# Patient Record
Sex: MALE | Race: WHITE | NOT HISPANIC OR LATINO | Employment: OTHER | URBAN - METROPOLITAN AREA
[De-identification: names, ages, dates, MRNs, and addresses within clinical notes are randomized per-mention and may not be internally consistent; named-entity substitution may affect disease eponyms.]

---

## 2017-01-17 ENCOUNTER — ALLSCRIPTS OFFICE VISIT (OUTPATIENT)
Dept: OTHER | Facility: OTHER | Age: 52
End: 2017-01-17

## 2017-01-17 DIAGNOSIS — F31.13 BIPOLAR DISORDER, CURRENT EPISODE MANIC WITHOUT PSYCHOTIC FEATURES, SEVERE (HCC): ICD-10-CM

## 2017-04-18 ENCOUNTER — ALLSCRIPTS OFFICE VISIT (OUTPATIENT)
Dept: OTHER | Facility: OTHER | Age: 52
End: 2017-04-18

## 2017-06-13 ENCOUNTER — GENERIC CONVERSION - ENCOUNTER (OUTPATIENT)
Dept: OTHER | Facility: OTHER | Age: 52
End: 2017-06-13

## 2017-06-13 LAB
BUN SERPL-MCNC: 11 MG/DL (ref 7–25)
BUN/CREA RATIO (HISTORICAL): NORMAL (CALC) (ref 6–22)
CALCIUM SERPL-MCNC: 9.3 MG/DL (ref 8.6–10.3)
CHLORIDE SERPL-SCNC: 108 MMOL/L (ref 98–110)
CO2 SERPL-SCNC: 26 MMOL/L (ref 20–31)
CREAT SERPL-MCNC: 0.97 MG/DL (ref 0.6–1.35)
EGFR AFRICAN AMERICAN (HISTORICAL): 106 ML/MIN/1.73M2
EGFR-AMERICAN CALC (HISTORICAL): 91 ML/MIN/1.73M2
GLUCOSE (HISTORICAL): 95 MG/DL (ref 65–99)
LITHIUM LEVEL (HISTORICAL): 0.6 MMOL/L (ref 0.6–1.2)
POTASSIUM SERPL-SCNC: 4.1 MMOL/L (ref 3.5–5.3)
SODIUM SERPL-SCNC: 142 MMOL/L (ref 135–146)

## 2017-07-10 ENCOUNTER — ALLSCRIPTS OFFICE VISIT (OUTPATIENT)
Dept: OTHER | Facility: OTHER | Age: 52
End: 2017-07-10

## 2017-10-09 ENCOUNTER — ALLSCRIPTS OFFICE VISIT (OUTPATIENT)
Dept: OTHER | Facility: OTHER | Age: 52
End: 2017-10-09

## 2017-12-27 ENCOUNTER — ALLSCRIPTS OFFICE VISIT (OUTPATIENT)
Dept: OTHER | Facility: OTHER | Age: 52
End: 2017-12-27

## 2018-01-09 NOTE — PSYCH
Psych Med Mgmt    Appearance: was calm and cooperative, adequate hygiene and grooming and good eye contact  Observed mood: euthymic  Observed mood: affect was blunted and affect was constricted  Speech: speech soft  Thought processes: coherent/organized  Hallucinations: no hallucinations present  Abnormal Thoughts: The patient has no suicidal thoughts and no homicidal thoughts  Orientation: The patient is oriented to person, place and time  Recent and Remote Memory: short term memory intact and long term memory intact  Attention Span And Concentration: concentration intact  Insight: Insight intact  Judgment: His judgment was intact  Muscle Strength And Tone  Muscle strength and tone were normal  Normal gait and station  On a scale of 0 - 10 the pain severity is a 0  Treatment Recommendations: continue with same meds and return to office in a few weeks  Risks, Benefits And Possible Side Effects Of Medications: Risks, benefits, and possible side effects of medications explained to patient and patient verbalizes understanding  He reports normal appetite, normal energy level, no weight change and normal number of sleep hours  The patient was seen for continuing care and pharmacotherapy  There has been no change in his clinical status and he remains in remission  He complains of increased urinary frequency and polyuria which is related to lithium  His lithium level from a few weeks back is 0 8  They were able to buy his mother-in-law's home through foreclosure  Sleeping and eating well  Assessment    1  Bipolar I disorder, current or most recent episode manic, severe with atypical features   (296 43) (F31 13)    Review of Systems    Constitutional: No fever, no chills, feels well, no tiredness, no recent weight gain or loss  Cardiovascular: no complaints of slow or fast heart rate, no chest pain, no palpitations     Respiratory: no complaints of shortness of breath, no wheezing, no dyspnea on exertion  Genitourinary: urinary frequency  Musculoskeletal: no complaints of arthralgia, no myalgias, no limb pain, no joint stiffness  Integumentary: no complaints of skin rash, no itching, no dry skin  Active Problems    1  Bipolar I disorder, current or most recent episode manic, severe with atypical features   (296 43) (F31 13)   2  Encounter for long-term (current) use of high-risk medication (V58 69) (Z79 899)   3  Encounter for medication monitoring (V58 83) (Z51 81)    Allergies    1  No Known Drug Allergies    Current Meds   1  Benztropine Mesylate 2 MG Oral Tablet; TAKE 1 TABLET DAILY AS DIRECTED; Therapy: 65WKM6306 to (Last Rx:28Mar2016) Ordered   2  Lithium Carbonate  MG Oral Tablet Extended Release; Take 1 tablet twice daily; Therapy: 46WVR5667 to (Renew:23Mar2017); Last Rx:28Mar2016 Ordered   3  OLANZapine 5 MG Oral Tablet; 1 po qhs;   Therapy: 25QRJ9550 to (Renew:23Mar2017); Last Rx:28Mar2016 Ordered    Family Psych History  Mother    1  No pertinent family history    Social History    · Never a smoker    End of Encounter Meds    1  Benztropine Mesylate 2 MG Oral Tablet; TAKE 1 TABLET DAILY AS DIRECTED; Therapy: 71YVX9527 to (Last Rx:28Mar2016) Ordered   2  Lithium Carbonate  MG Oral Tablet Extended Release; Take 1 tablet twice daily; Therapy: 85VPK7021 to (Renew:23Mar2017); Last Rx:28Mar2016 Ordered   3  OLANZapine 5 MG Oral Tablet; 1 po qhs;   Therapy: 17APT9695 to (Renew:23Mar2017);  Last Rx:28Mar2016 Ordered    Future Appointments    Date/Time Provider Specialty Site   08/29/2016 03:10 PM Farhan Hidalgo MD Psychiatry [facility]   09/27/2016 03:10 PM Farhan Hidalgo MD Psychiatry [facility]   10/31/2016 03:10 PM Farhan Hidalgo MD Psychiatry [facility]   11/22/2016 03:10 PM Farhan Hidalgo MD Psychiatry [facility]   12/19/2016 03:30 PM Mynor Jaden Patel MD Psychiatry [facility]   [facility]2016 09:[facility]  [address] [facility]     Signatures   Electronically signed by : Malena Magdaleno MD; Jun 27 2016  3:26PM EST                       (Author)

## 2018-01-11 NOTE — PSYCH
Psych Med Mgmt    Appearance: was calm and cooperative and good eye contact   unshaven beard  Observed mood: euthymic  Observed mood: affect appropriate  Speech: speech soft  Thought processes: coherent/organized  Hallucinations: no hallucinations present  Thought Content: no delusions  Abnormal Thoughts: The patient has no suicidal thoughts and no homicidal thoughts  Orientation: The patient is oriented to person, place and time  Recent and Remote Memory: short term memory intact and long term memory intact  Attention Span And Concentration: concentration impaired  Insight: Insight intact  Judgment: His judgment was intact  Treatment Recommendations: continue with same meds  Check Li level and BMP  RTO in 3 months  Risks, Benefits And Possible Side Effects Of Medications: Risks, benefits, and possible side effects of medications explained to patient and patient verbalizes understanding  He reports decreased appetite, normal energy level and recent slight lbs weight loss  The patient was seen for continuing care and pharmacotherapy of bipolar disorder  There has been no change in his clinical status  Some people who were staying at the house and paying rent and share expenses, have moved out and this is causing financial problems  He has lost a few pounds because his appetite is not as good  No diarrhea or vomiting reported  Sleeps well  Assessment    1  Bipolar I disorder, current or most recent episode manic, severe with atypical features   (296 43) (F31 13)    Plan    1  (1) BASIC METABOLIC PROFILE; Status:Active; Requested LFH:27PGP5996;    2  (1) LITHIUM; Status:Active; Requested TSM:91IBD5724;     Review of Systems    Constitutional: No fever, no chills, feels well, no tiredness, no recent weight gain or loss  Cardiovascular: no complaints of slow or fast heart rate, no chest pain, no palpitations     Respiratory: no complaints of shortness of breath, no wheezing, no dyspnea on exertion  Gastrointestinal: no complaints of abdominal pain, no constipation, no nausea, no diarrhea, no vomiting  Genitourinary: no complaints of dysuria, no incontinence, no pelvic pain, no urinary frequency  Musculoskeletal: no complaints of arthralgia, no myalgias, no limb pain, no joint stiffness  Active Problems    1  Bipolar I disorder, current or most recent episode manic, severe with atypical features   (296 43) (F31 13)   2  Encounter for long-term (current) use of high-risk medication (V58 69) (Z79 899)   3  Encounter for medication monitoring (V58 83) (Z51 81)    Allergies    1  No Known Drug Allergies    Current Meds   1  Benztropine Mesylate 1 MG Oral Tablet; TAKE 1 TABLET AT BEDTIME; Therapy: 14PWJ0663 to (Evaluate:29Apr2017); Last Rx:31Oct2016 Ordered   2  Lithium Carbonate  MG Oral Tablet Extended Release; Take 1 tablet twice daily; Therapy: 71FVR3168 to (Last Rx:31Oct2016) Ordered   3  OLANZapine 5 MG Oral Tablet; Therapy: 78RMD9093 to (Last Rx:31Oct2016) Ordered    Family Psych History  Mother    1  No pertinent family history    Social History    · Never a smoker    End of Encounter Meds    1  Benztropine Mesylate 1 MG Oral Tablet; TAKE 1 TABLET AT BEDTIME; Therapy: 87HCG6197 to (Evaluate:29Apr2017); Last Rx:31Oct2016 Ordered   2  Lithium Carbonate  MG Oral Tablet Extended Release; Take 1 tablet twice daily; Therapy: 59QNQ9254 to (Last Rx:31Oct2016) Ordered   3  OLANZapine 5 MG Oral Tablet;    Therapy: 91ZRN2707 to (Last Rx:31Oct2016) Ordered    Signatures   Electronically signed by : Deyanira Looney MD; Jan 17 2017  3:26PM EST                       (Author)

## 2018-01-12 NOTE — PSYCH
Message  Message Free Text Note Form: RX accidentally shredded so re ordered      Active Problems    1  Bipolar I disorder, current or most recent episode manic, severe with atypical features   (296 43) (F31 13)   2  Encounter for medication monitoring (M73 83) (Z51 81)    Current Meds   1  Benztropine Mesylate 2 MG Oral Tablet; take 1 tablet by mouth every day; Therapy: 79RAY0588 to (Evaluate:07Mar2016); Last Rx:46Jkh4756 Ordered   2  Lithium Carbonate  MG Oral Tablet Extended Release; Take 1 tablet twice daily; Therapy: 43CGX8397 to (Evaluate:07Mar2016); Last Rx:84Uvq6142 Ordered   3  OLANZapine 5 MG Oral Tablet; TAKE 1 TABLET AT BEDTIME; Therapy: 33HMI8410 to (Evaluate:07Mar2016); Last Rx:75Xwm5027 Ordered    Allergies    1  No Known Drug Allergies    Plan    1  Benztropine Mesylate 2 MG Oral Tablet; TAKE 1 TABLET DAILY AS DIRECTED   2  Lithium Carbonate  MG Oral Tablet Extended Release; Take 1 tablet   twice daily   3   OLANZapine 5 MG Oral Tablet; 1 po qhs    Signatures   Electronically signed by : MARU Mendoza; Mar  9 2016  9:57AM EST                       (Author)

## 2018-01-12 NOTE — PSYCH
Psych Med Mgmt    Appearance: was calm and cooperative  Observed mood: was dysphoric and apathetic   Observed mood: affect was blunted  Speech: a normal rate  Thought processes: coherent/organized  Hallucinations: no hallucinations present  Thought Content: no delusions  Abnormal Thoughts: The patient has no suicidal thoughts and no homicidal thoughts  Orientation: The patient is oriented to person, place and time  Recent and Remote Memory: short term memory intact and long term memory intact  Attention Span And Concentration: concentration intact  Insight: Insight intact  Judgment: His judgment was intact  Treatment Recommendations: Cogentin 2 mg po qhs  Lithium  mg po bid  Olanzapine 5 mg po qhs    Reviewed blood work with patient  Risks, Benefits And Possible Side Effects Of Medications: Risks, benefits, and possible side effects of medications explained to patient and patient verbalizes understanding  He reports normal appetite, decreased energy, no weight change and normal number of sleep hours  Pt seen for follow up Bipolar Disorder  He appears dysphoric with little motivation- residual depression  Sleep fair- disrupted at times due to polyuria from medication  Appetite is fair  He tries to spend time with family  He does have dry mouth but otherwise tolerating meds well  Vitals  Signs   Recorded: 70CPY5034 71:67WX   Systolic: 564  Diastolic: 85  Heart Rate: 61  Respiration: 16  Recorded: 16Mxn1382 09:36AM   Respiration: 16  Height: 5 ft 10 in  Weight: 187 lb   BMI Calculated: 26 83  BSA Calculated: 2 03    DSM    Provisional Diagnosis: Bipolar Disorder I  Assessment    1  Bipolar I disorder, current or most recent episode manic, severe with atypical features   (296 43) (F31 13)    Review of Systems    Constitutional: No fever, no chills, feels well, no tiredness, no recent weight gain or loss  Active Problems    1   Bipolar I disorder, current or most recent episode manic, severe with atypical features   (296 43) (F31 13)   2  Encounter for long-term (current) use of high-risk medication (V58 69) (Z79 899)   3  Encounter for medication monitoring (V58 83) (Z51 81)    Allergies    1  No Known Drug Allergies    Current Meds   1  Benztropine Mesylate 2 MG Oral Tablet; TAKE 1 TABLET DAILY AS DIRECTED; Therapy: 75NCP4928 to (Last Rx:28Mar2016) Ordered   2  Lithium Carbonate  MG Oral Tablet Extended Release; Take 1 tablet twice daily; Therapy: 53NXQ5517 to (Renew:23Mar2017); Last Rx:28Mar2016 Ordered   3  OLANZapine 5 MG Oral Tablet; 1 po qhs;   Therapy: 22RGR8007 to (Renew:23Mar2017); Last Rx:28Mar2016 Ordered    The medication list was reviewed and updated today  Family Psych History  Mother    1  No pertinent family history    The family history was reviewed and updated today  Social History    · Never a smoker  The social history was reviewed and updated today  The social history was reviewed and is unchanged  End of Encounter Meds    1  Benztropine Mesylate 2 MG Oral Tablet; TAKE 1 TABLET DAILY AS DIRECTED; Therapy: 56RDD5482 to (Last Rx:28Mar2016) Ordered   2  Lithium Carbonate  MG Oral Tablet Extended Release; Take 1 tablet twice daily; Therapy: 95EPF9286 to (Renew:23Mar2017); Last Rx:28Mar2016 Ordered   3  OLANZapine 5 MG Oral Tablet; 1 po qhs;   Therapy: 82VSP7435 to (Renew:23Mar2017);  Last Rx:28Mar2016 Ordered    Future Appointments    Date/Time Provider Specialty Site   08/29/2016 03:10 PM Timothy Dietz MD Psychiatry [facility]   09/27/2016 03:10 PM Timothy Dietz MD Psychiatry [facility]   10/31/2016 03:10 PM Timothy Dietz MD Psychiatry [facility]   11/22/2016 03:10 PM Timothy Dietz MD Psychiatry [facility]   12/19/2016 03:30 PM Timothy Dietz MD Psychiatry [place] [address]  [address] Electronically signed by : Sugey Etienne [facility]; Jul 20 2016  9:45AM EST                       (Author)    Electronically signed by : Angelia Rangel MD; Aug  9 2016  3:18PM EST

## 2018-01-12 NOTE — PSYCH
Psych Med Mgmt    Appearance: was calm and cooperative  Observed mood: was dysphoric  Observed mood: affect was constricted  Speech: monotonous  Thought processes: coherent/organized  Hallucinations: no hallucinations present  Thought Content: no delusions  Abnormal Thoughts: The patient has no suicidal thoughts and no homicidal thoughts  Orientation: The patient is oriented to person, place and time  Recent and Remote Memory: short term memory intact and long term memory intact  Attention Span And Concentration: concentration intact  Insight: Insight intact  Judgment: His judgment was intact  Muscle Strength And Tone  Normal gait and station  Treatment Recommendations: Check  Lithium level, lipid panel and CMP    Lithium  mg 2 po qhs  Olanzapine 5 mg po qhs  Cogentin 2 mg po qd  Risks, Benefits And Possible Side Effects Of Medications: Risks, benefits, and possible side effects of medications explained to patient and patient verbalizes understanding  He reports decreased appetite, decreased energy, no weight change and decrease in number of sleep hours   Pt seen for follow up Bipolar Disorder I  Pt states he feels "the same"  He continues with a flat affect and little motivation  Speech lacks spontaneity  No anxiety or panic attacks- states his depression is "same"  He states he goes to [employer] during the days  He has a stable relationship with spouse  No new medical issues or problems  Discussed diet- states he only eats once a day most days  Vitals  Signs [Data Includes: Current Encounter]   Recorded: [id number] 03:27PM   Heart Rate: 65  Respiration: 16  Systolic: 508  Diastolic: 81  Recorded: 17AIO7291 03:17PM   Height: 5 ft 10 in  Weight: 185 lb   BMI Calculated: 26 54  BSA Calculated: 2 02    DSM    Provisional Diagnosis: Bipolar Disorder I  Assessment    1   Bipolar I disorder, current or most recent episode manic, severe with atypical features (296 43) (F31 13)   2  Encounter for medication monitoring (V58 83) (Z38 81)    Plan    1  (1) COMPREHENSIVE METABOLIC PANEL; Status:Active; Requested for:11Let3429;    2  (1) LIPID PANEL, FASTING; Status:Active; Requested for:38Uim2806;    3  (1) LITHIUM; Status:Active; Requested for:61Nhu6975;     Review of Systems    Constitutional: No fever, no chills, feels well, no tiredness, no recent weight gain or loss  Cardiovascular: no complaints of slow or fast heart rate, no chest pain, no palpitations  Respiratory: no complaints of shortness of breath, no wheezing, no dyspnea on exertion  Gastrointestinal: no complaints of abdominal pain, no constipation, no nausea, no diarrhea, no vomiting  Genitourinary: no complaints of dysuria, no incontinence, no pelvic pain, no urinary frequency  Musculoskeletal: no complaints of arthralgia, no myalgias, no limb pain, no joint stiffness  Integumentary: no complaints of skin rash, no itching, no dry skin  Neurological: no complaints of headache, no confusion, no numbness, no dizziness  Active Problems    1  Bipolar I disorder, current or most recent episode manic, severe with atypical features   (296 43) (F31 13)   2  Encounter for medication monitoring (V58 83) (J42 28)    Allergies    1  No Known Drug Allergies    Current Meds   1  Benztropine Mesylate 2 MG Oral Tablet; TAKE 1 TABLET DAILY AS DIRECTED; Therapy: 74IIP4106 to (Last Rx:28Mar2016) Ordered   2  Lithium Carbonate  MG Oral Tablet Extended Release; Take 1 tablet twice daily; Therapy: 44IQR0898 to (Renew:23Mar2017); Last Rx:28Mar2016 Ordered   3  OLANZapine 5 MG Oral Tablet; 1 po qhs;   Therapy: 66CFR3393 to (Renew:23Mar2017); Last Rx:28Mar2016 Ordered    The medication list was reviewed and updated today  Family Psych History  Mother    1  No pertinent family history    The family history was reviewed and updated today         Social History    · Never a smoker  The social history was reviewed and updated today  The social history was reviewed and is unchanged  End of Encounter Meds    1  Benztropine Mesylate 2 MG Oral Tablet; TAKE 1 TABLET DAILY AS DIRECTED; Therapy: 26KFZ2858 to (Last Rx:28Mar2016) Ordered   2  Lithium Carbonate  MG Oral Tablet Extended Release; Take 1 tablet twice daily; Therapy: 70GJO4772 to (Renew:23Mar2017); Last Rx:28Mar2016 Ordered   3  OLANZapine 5 MG Oral Tablet; 1 po qhs;   Therapy: 66FNB9826 to (Renew:23Mar2017);  Last Rx:28Mar2016 Ordered    Future Appointments    Date/Time Provider Specialty Site   06/27/2016 03:10 PM Elijah De Jesus MD Psychiatry [facility]   08/29/2016 03:10 PM Elijah De Jesus MD Psychiatry [facility]   09/27/2016 03:10 PM Elijah De Jesus MD Psychiatry [facility]   10/31/2016 03:10 PM Elijah De Jesus MD Psychiatry [facility]   11/22/2016 03:10 PM Elijah De Jesus MD Psychiatry [facility]   12/19/2016 03:30 PM Elijah De Jesus MD Psychiatry [facility]   07/20/2016 09:30 AM Earl Guzmán [facility] Psychiatry [address] [facility]     Signatures   Electronically signed by : Emory Tobin [facility]; May 18 2016  3:31PM EST                       (Author)    Electronically signed by : Rodrick Duckworth MD; May 18 2016  5:17PM EST

## 2018-01-13 NOTE — RESULT NOTES
Verified Results  (1) LITHIUM 96SNW7686 08:59AM Falguni Lowe LEY[facility]   REPORT COMMENT:  FASTING:YES     Test Name Result Flag Reference   LITHIUM 0 6 mmol/L  0 6-1 2     (1) BASIC METABOLIC PROFILE 04EHH6356 08:59AM [employer]   REPORT COMMENT:  FASTING:YES     Test Name Result Flag Reference   GLUCOSE 95 mg/dL  65-99   Fasting reference interval   UREA NITROGEN (BUN) 11 mg/dL  7-25   CREATININE 0 97 mg/dL  0 60-1 35   eGFR NON-AFR   AMERICAN 91 mL/min/1 73m2  > OR = 60   eGFR AFRICAN AMERICAN 106 mL/min/1 73m2  > OR = 60   BUN/CREATININE RATIO   8-49   NOT APPLICABLE (calc)   SODIUM 142 mmol/L  135-146   POTASSIUM 4 1 mmol/L  3 5-5 3   CHLORIDE 108 mmol/L     CARBON DIOXIDE 26 mmol/L  20-31   CALCIUM 9 3 mg/dL  8 6-10 3

## 2018-01-14 NOTE — PSYCH
Psych Med Mgmt    Appearance: was calm and cooperative, adequate hygiene and grooming and good eye contact  Observed mood: anxious  Observed mood: affect was flat  Speech: decreased volume, but a normal rate  Thought processes: coherent/organized  Hallucinations: no hallucinations present  Thought Content: no delusions  Abnormal Thoughts: The patient has no suicidal thoughts and no homicidal thoughts  Orientation: The patient is oriented to person, place and time  Recent and Remote Memory: short term memory intact and long term memory intact  Attention Span And Concentration: concentration impaired  Insight: Insight intact  Judgment: His judgment was intact  Treatment Recommendations: continue with same meds and RTO in one month  Eskalith  mg b i d  Olanzapine 5 mg daily  Cogentin 2 mg daily  Risks, Benefits And Possible Side Effects Of Medications: Risks, benefits, and possible side effects of medications explained to patient and patient verbalizes understanding  He reports normal appetite, normal energy level, no weight change and decrease in number of sleep hours due to frequent urination  Robin See was seen for continuing care and pharmacotherapy  Since his last visit, his social security was approved  He is thinking about the future  Depressive symptoms are in remission and he has not had any manic symptoms  Unfortunately the side effects of medications remain a problem  He continues to have a flat affect and bradykinesia and also frequent urination which is related to lithium  Assessment    1  Bipolar I disorder, current or most recent episode manic, severe with atypical features   (296 43) (F31 13)    Active Problems    1  Bipolar I disorder, current or most recent episode manic, severe with atypical features   (296 43) (F31 13)   2  Encounter for medication monitoring (S93 91) (F68 99)    Allergies    1  No Known Drug Allergies    Current Meds   1  Benztropine Mesylate 2 MG Oral Tablet; TAKE 1 TABLET DAILY AS DIRECTED; Therapy: 29CTK3369 to (Last Rx:28Mar2016) Ordered   2  Lithium Carbonate  MG Oral Tablet Extended Release; Take 1 tablet twice daily; Therapy: 22PNK9608 to (Renew:23Mar2017); Last Rx:28Mar2016 Ordered   3  OLANZapine 5 MG Oral Tablet; 1 po qhs;   Therapy: 60PFX3874 to (Renew:23Mar2017); Last Rx:28Mar2016 Ordered    Family Psych History  Mother    1  No pertinent family history    Social History    · Never a smoker    End of Encounter Meds    1  Benztropine Mesylate 2 MG Oral Tablet; TAKE 1 TABLET DAILY AS DIRECTED; Therapy: 96SQJ2134 to (Last Rx:28Mar2016) Ordered   2  Lithium Carbonate  MG Oral Tablet Extended Release; Take 1 tablet twice daily; Therapy: 51SEC5462 to (Renew:23Mar2017); Last Rx:28Mar2016 Ordered   3  OLANZapine 5 MG Oral Tablet; 1 po qhs;   Therapy: 48QQV6029 to (Renew:23Mar2017);  Last Rx:28Mar2016 Ordered    Future Appointments    Date/Time Provider Specialty Site   05/24/2016 03:10 PM Elijah De Jesus MD Psychiatry [facility]   06/27/2016 03:10 PM Elijah De Jesus MD Psychiatry [facility]   08/29/2016 03:10 PM Elijah De Jesus MD Psychiatry [facility]   09/27/2016 03:10 PM Elijah De Jesus MD Psychiatry [facility]   10/31/2016 03:10 PM Elijah De Jesus MD Psychiatry [facility]   11/22/2016 03:10 PM Elijah De Jesus MD Psychiatry [facility]   12/19/2016 03:30 PM Elijah De Jesus MD Psychiatry [facility]   07/20/2016 09:30 AM Earl Guzmán [facility] Psychiatry [facility]     Signatures   Electronically signed by : Rodrick Duckworth MD; Apr 26 2016  3:13PM EST                       (Author)

## 2018-01-14 NOTE — PSYCH
Psych Med Mgmt    Appearance: was calm and cooperative, adequate hygiene and grooming and good eye contact  Observed mood: was dysphoric and anxious  Observed mood: affect was flat  Speech:  muffled  Thought processes: coherent/organized  Hallucinations: no hallucinations present  Thought Content: no delusions  Abnormal Thoughts: The patient has no suicidal thoughts  Orientation: The patient is oriented to person, place and time  Recent and Remote Memory: short term memory intact and long term memory intact  Attention Span And Concentration: concentration impaired  Insight: Insight intact  Muscle Strength And Tone  Muscle strength and tone were normal    The patient is experiencing no localized pain  Treatment Recommendations: Continue with same medications and return to the office in a few weeks  Risks, Benefits And Possible Side Effects Of Medications: Risks, benefits, and possible side effects of medications explained to patient and patient verbalizes understanding  He reports normal appetite, normal energy level, no weight change and normal number of sleep hours  Hugh Michael was seen for continuing care and pharmacotherapy  Although he is not psychotic, he still has anxiety  He has anticholinergic side effects with Cogentin and I cannot use a higher dose  He continues to have affect of flattening and muffled voice consistent with drug induced parkinsonism  His mother-in-law's house is going to go for [employer] and he might not have a place to live  Assessment    1  Bipolar I disorder, current or most recent episode manic, severe with atypical features   (296 43) (F31 13)    Review of Systems    Constitutional: Blurred vision and dry mouth  Cardiovascular: no complaints of slow or fast heart rate, no chest pain, no palpitations  Respiratory: no complaints of shortness of breath, no wheezing, no dyspnea on exertion     Gastrointestinal: no complaints of abdominal pain, no constipation, no nausea, no diarrhea, no vomiting  Genitourinary: no complaints of dysuria, no incontinence, no pelvic pain, no urinary frequency  Musculoskeletal: no complaints of arthralgia, no myalgias, no limb pain, no joint stiffness  Integumentary: no complaints of skin rash, no itching, no dry skin  Neurological: no complaints of headache, no confusion, no numbness, no dizziness  Active Problems    1  Bipolar I disorder, current or most recent episode manic, severe with atypical features   (296 43) (F31 13)   2  Encounter for medication monitoring (R30 83) (Z51 81)    Allergies    1  No Known Drug Allergies    Current Meds   1  Benztropine Mesylate 2 MG Oral Tablet; take 1 tablet by mouth every day; Therapy: 27JIR3047 to (Evaluate:07Mar2016); Last Rx:89Rrg4128 Ordered   2  Lithium Carbonate  MG Oral Tablet Extended Release; Take 1 tablet twice daily; Therapy: 02LRK7632 to (Evaluate:07Mar2016); Last Rx:81Gol3732 Ordered   3  OLANZapine 5 MG Oral Tablet; TAKE 1 TABLET AT BEDTIME; Therapy: 35VNP7894 to (Evaluate:07Mar2016); Last Rx:19Jml4214 Ordered    Family Psych History    1  No pertinent family history    Social History    · Never a smoker    End of Encounter Meds    1  Benztropine Mesylate 2 MG Oral Tablet; take 1 tablet by mouth every day; Therapy: 28THT5977 to (Evaluate:07Mar2016); Last Rx:23Jds4292 Ordered   2  Lithium Carbonate  MG Oral Tablet Extended Release; Take 1 tablet twice daily; Therapy: 08WCV6278 to (Evaluate:07Mar2016); Last Rx:62Oul2096 Ordered   3  OLANZapine 5 MG Oral Tablet; TAKE 1 TABLET AT BEDTIME; Therapy: 95YAW4069 to (Evaluate:07Mar2016);  Last Rx:73Fvx4690 Ordered    Future Appointments    Date/Time Provider Specialty Site   02/29/2016 03:50 PM Nikolai Vazquez MD Psychiatry [facility]   03/28/2016 03:10 PM Nikolai Vazquez MD Psychiatry [facility]   04/26/2016 03:10 PM Nikolai Vazquez MD Psychiatry [facility]   05/24/2016 03:10 PM Mateo Soto MD Psychiatry [facility]   06/27/2016 03:10 PM Mateo Soto MD Psychiatry [facility]   08/29/2016 03:10 PM Mateo Soto MD Psychiatry [facility]   09/27/2016 03:10 PM Mateo Soto MD Psychiatry [facility]   10/31/2016 03:10 PM Mateo Soto MD Psychiatry [facility]   11/22/2016 03:10 PM Mateo Soto MD Psychiatry [facility]   07[facility]20/2016 09:30 [facility] [address] [facility]     Signatures   Electronically signed by : Caroline Onofre MD; Feb 29 2016  3:52PM EST                       (Author)

## 2018-01-14 NOTE — PSYCH
Psych Med Mgmt    Appearance: was calm and cooperative, adequate hygiene and grooming and good eye contact  Observed mood: euthymic  Observed mood: affect was flat  Speech: a normal rate  Thought processes: coherent/organized  Hallucinations: no hallucinations present  Thought Content: no delusions  Abnormal Thoughts: The patient has no suicidal thoughts and no homicidal thoughts  Orientation: The patient is oriented to person, place and time  Recent and Remote Memory: short term memory intact and long term memory intact  Attention Span And Concentration: concentration intact  Insight: Insight intact  Judgment: His judgment was intact  Muscle Strength And Tone  Muscle strength and tone were normal  Normal gait and station  The patient is experiencing no localized pain  Treatment Recommendations: Continue with lithium and Zyprexa at the same  The patient is very much concerned of having a relapse and therefore, I don't believe that reducing Zyprexa is prudent at this point  He was instructed to reduce benztropine to 1 mg at bedtime only  I will see him in 3 months  Risks, Benefits And Possible Side Effects Of Medications: Risks, benefits, and possible side effects of medications explained to patient and patient verbalizes understanding  He reports normal appetite, normal energy level, no weight change and normal number of sleep hours  seen for bipolar disorder  No change in clinical status  He is now receiving only Social Security disability  Home situation is stable despite financial hardship  Many nights, he has to get up frequently to go to bathroom  This is probably related to lithium but it could also be related to prostate and he was referred to his primary care for that purpose  Dry mouth and blurred vision are the only other side effects  Assessment    1   Bipolar I disorder, current or most recent episode manic, severe with atypical features   (296 43) (F31 13)    Plan    1  Benztropine Mesylate 2 MG Oral Tablet; TAKE 1 TABLET DAILY AS DIRECTED   2  Lithium Carbonate  MG Oral Tablet Extended Release; Take 1 tablet   twice daily   3  OLANZapine 5 MG Oral Tablet; 1 po qhs    Review of Systems    Constitutional: dry mouth, but No fever, no chills, feels well, no tiredness, no recent weight gain or loss  Cardiovascular: no complaints of slow or fast heart rate, no chest pain, no palpitations  Respiratory: no complaints of shortness of breath, no wheezing, no dyspnea on exertion  Gastrointestinal: no complaints of abdominal pain, no constipation, no nausea, no diarrhea, no vomiting  Genitourinary: urinary frequency  Musculoskeletal: no complaints of arthralgia, no myalgias, no limb pain, no joint stiffness  Integumentary: no complaints of skin rash, no itching, no dry skin  Neurological: no complaints of headache, no confusion, no numbness, no dizziness  Other Symptoms: blurred vision  Active Problems    1  Bipolar I disorder, current or most recent episode manic, severe with atypical features   (296 43) (F31 13)   2  Encounter for long-term (current) use of high-risk medication (V58 69) (Z46 899)   3  Encounter for medication monitoring (V58 83) (Z51 81)    Allergies    1  No Known Drug Allergies    Current Meds   1  Benztropine Mesylate 2 MG Oral Tablet; TAKE 1 TABLET DAILY AS DIRECTED; Therapy: 15HQM8850 to (Last Rx:28Mar2016) Ordered   2  Lithium Carbonate  MG Oral Tablet Extended Release; Take 1 tablet twice daily; Therapy: 21DVH6715 to (Renew:23Mar2017); Last Rx:28Mar2016 Ordered   3  OLANZapine 5 MG Oral Tablet; 1 po qhs;   Therapy: 64WXO6211 to (Renew:23Mar2017); Last Rx:28Mar2016 Ordered    Family Psych History  Mother    1  No pertinent family history    Social History    · Never a smoker    End of Encounter Meds    1  Benztropine Mesylate 2 MG Oral Tablet; TAKE 1 TABLET DAILY AS DIRECTED;    Therapy: 30POS8128 to (Last Rx:28Mar2016) Ordered   2  Lithium Carbonate  MG Oral Tablet Extended Release; Take 1 tablet twice daily; Therapy: 37CDW0462 to (Renew:23Mar2017); Last Rx:28Mar2016 Ordered   3  OLANZapine 5 MG Oral Tablet; 1 po qhs;   Therapy: 67VJR4296 to (Renew:23Mar2017);  Last Rx:28Mar2016 Ordered    Future Appointments    Date/Time Provider Specialty Site   09/27/2016 03:10 PM Farhan Hidalgo MD Psychiatry [facility]   10/31/2016 03:50 PM Farhan Hidalgo MD Psychiatry [facility]   11/22/2016 03:10 PM Farhan Hidalgo MD Psychiatry [facility]   12/19/2016 03:30 PM Farhan Hidalgo MD [address] [facility]     Signatures   Electronically signed by : Fransico Posada MD; Aug 29 2016  2:26PM EST                       (Author)

## 2018-01-15 NOTE — RESULT NOTES
Verified Results  (1) LIPID PANEL, FASTING [id number] 08:44AM [facility]     Test Name Result Flag Reference   CHOLESTEROL, TOTAL 127 mg/dL  125-200   HDL CHOLESTEROL 31 mg/dL L > OR = 40   TRIGLICERIDES 98 mg/dL  <093   LDL-CHOLESTEROL 76 mg/dL (calc)  <130   Desirable range <100 mg/dL for patients with CHD or  diabetes and <70 mg/dL for diabetic patients with  known heart disease  CHOL/HDLC RATIO 4 1 (calc)  < OR = 5 0   NON HDL CHOLESTEROL 96 mg/dL (calc)     Target for non-HDL cholesterol is 30 mg/dL higher than   LDL cholesterol target  (1) COMPREHENSIVE METABOLIC PANEL [id number] 49:10QQ [facility]   REPORT COMMENT:  FASTING:YES     Test Name Result Flag Reference   GLUCOSE 98 mg/dL  65-99   Fasting reference interval   UREA NITROGEN (BUN) 13 mg/dL  7-25   CREATININE 0 87 mg/dL  0 60-1 35   eGFR NON-AFR   AMERICAN 102 mL/min/1 73m2  > OR = 60   eGFR AFRICAN AMERICAN 118 mL/min/1 73m2  > OR = 60   BUN/CREATININE RATIO   8-52   NOT APPLICABLE (calc)   SODIUM 142 mmol/L  135-146   POTASSIUM 3 9 mmol/L  3 5-5 3   CHLORIDE 109 mmol/L     CARBON DIOXIDE 25 mmol/L  19-30   CALCIUM 9 2 mg/dL  8 6-10 3   PROTEIN, TOTAL 6 8 g/dL  6 1-8 1   ALBUMIN 4 3 g/dL  3 6-5 1   GLOBULIN 2 5 g/dL (calc)  1 9-3 7   ALBUMIN/GLOBULIN RATIO 1 7 (calc)  1 0-2 5   BILIRUBIN, TOTAL 0 6 mg/dL  0 2-1 2   ALKALINE PHOSPHATASE 46 U/L     AST 14 U/L  10-40   ALT 14 U/L  9-46

## 2018-01-15 NOTE — PSYCH
Psych Med Mgmt    Appearance: was calm and cooperative, adequate hygiene and grooming and good eye contact  Observed mood: euthymic  Observed mood: affect was blunted  Speech: decreased volume  Hallucinations: no hallucinations present  Thought Content: no delusions  Abnormal Thoughts: The patient has no suicidal thoughts and no homicidal thoughts  Orientation: The patient is oriented to person, place and time  Recent and Remote Memory: short term memory intact and long term memory intact  Attention Span And Concentration: concentration impaired  Insight: Insight intact  Judgment: His judgment was intact  Muscle Strength And Tone  Muscle strength and tone were normal  Normal gait and station  Language: no difficulty naming common objects and no difficulty repeating a phrase  Fund of knowledge: Patient displays adequate knowledge of current events  The patient is experiencing no localized pain  Treatment Recommendations: Reduce olanzapine to 2 5 mg at bedtime-continue with benztropine and lithium the same  He reports normal appetite, normal energy level, no weight change and normal number of sleep hours  seen for bipolar disorder  Remains in remission  His only complaint is that he can not keep his mouth closed and his lower jaw hangs  No drooling,Pain or difficulty swallowing and is associated with this symptom  He continues to have significantly blunted affect  No bradykinesia, shuffling, tremor or cogwheeling was noted  From a psychiatric standpoint, he remains in full remission  I doubt that this symptom is related to medications and I'm more concerned about an evolving movement disorder  Neurological evaluation was recommended and the patient is agreeable to do that  Assessment    1  Bipolar I disorder, current or most recent episode manic, severe with atypical features   (296 43) (F31 13)    Review of Systems    Constitutional: as noted in HPI     Cardiovascular: no complaints of slow or fast heart rate, no chest pain, no palpitations  Respiratory: no complaints of shortness of breath, no wheezing, no dyspnea on exertion  Gastrointestinal: no complaints of abdominal pain, no constipation, no nausea, no diarrhea, no vomiting  Genitourinary: urinary frequency  Musculoskeletal: no complaints of arthralgia, no myalgias, no limb pain, no joint stiffness  Integumentary: no complaints of skin rash, no itching, no dry skin  Neurological: no complaints of headache, no confusion, no numbness, no dizziness  Active Problems    1  Bipolar I disorder, current or most recent episode manic, severe with atypical features   (296 43) (F31 13)   2  Encounter for long-term (current) use of high-risk medication (V58 69) (Z79 899)   3  Encounter for medication monitoring (V58 83) (Z51 81)    Allergies    1  No Known Drug Allergies    Current Meds   1  Benztropine Mesylate 1 MG Oral Tablet; TAKE 1 TABLET AT BEDTIME; Therapy: 77CDY4196 to (Evaluate:61Ant5718); Last Rx:31Jan2017 Ordered   2  Lithium Carbonate  MG Oral Tablet Extended Release; Take 1 tablet twice daily; Therapy: 61OLF8869 to (Last Rx:31Jan2017) Ordered   3  OLANZapine 5 MG Oral Tablet; TAKE 1 TABLET AT BEDTIME; Therapy: 12NIK1987 to (Evaluate:26Tba9401); Last Rx:30Jan12 Ordered    Family Psych History  Mother    1  No pertinent family history    Social History    · Never a smoker    End of Encounter Meds    1  Benztropine Mesylate 1 MG Oral Tablet; TAKE 1 TABLET AT BEDTIME; Therapy: 58OZN1476 to (Evaluate:46Skt8871); Last Rx:31Jan2017 Ordered   2  Lithium Carbonate  MG Oral Tablet Extended Release; Take 1 tablet twice daily; Therapy: 03TCW9868 to (Last Rx:31Jan2017) Ordered   3  OLANZapine 5 MG Oral Tablet; TAKE 1 TABLET AT BEDTIME; Therapy: 23QCI1376 to (Evaluate:76Dxv8357);  Last Rx:31Jan2017 Ordered    Signatures   Electronically signed by : Johnny Goff MD; Apr 18 2017  3:59PM EST (Author)

## 2018-01-15 NOTE — PSYCH
Psych Med Mgmt    Appearance: was calm and cooperative  Observed mood: mood appropriate  Observed mood: affect appropriate  Speech: a normal rate  Thought processes: coherent/organized  Hallucinations: no hallucinations present  Thought Content: no delusions  Abnormal Thoughts: The patient has no suicidal thoughts  Orientation: The patient is oriented to person, place and time  Recent and Remote Memory: short term memory intact and long term memory intact  Attention Span And Concentration: concentration intact  Insight: Insight intact  Judgment: His judgment was intact  Treatment Recommendations: continue with current medications  He reports normal appetite, normal energy level, no weight change and normal number of sleep hours  Patient seen for bipolar disorder and reports that he is stable on his medications  He has not experienced any episodes of depression or anxiety, and continues to deny psychotic symptoms  He reports relationship with at remains good  Sleeping and eating remain adequate, and he is compliant with medications  He does not have any psychiatric or physical concerns at this time  Assessment    1  Bipolar I disorder, current or most recent episode manic, severe with atypical features   (296 43) (F31 13)    Review of Systems    Constitutional: No fever, no chills, feels well, no tiredness, no recent weight gain or loss  Cardiovascular: no complaints of slow or fast heart rate, no chest pain, no palpitations  Respiratory: no complaints of shortness of breath, no wheezing, no dyspnea on exertion  Gastrointestinal: no complaints of abdominal pain, no constipation, no nausea, no diarrhea, no vomiting  Genitourinary: no complaints of dysuria, no incontinence, no pelvic pain, no urinary frequency  Musculoskeletal: no complaints of arthralgia, no myalgias, no limb pain, no joint stiffness     Integumentary: no complaints of skin rash, no itching, no dry skin    Neurological: no complaints of headache, no confusion, no numbness, no dizziness  Active Problems    1  Bipolar I disorder, current or most recent episode manic, severe with atypical features   (296 43) (F31 13)   2  Encounter for long-term (current) use of high-risk medication (V58 69) (Z79 899)   3  Encounter for medication monitoring (V58 83) (Z51 81)    Allergies    1  No Known Drug Allergies    Current Meds   1  Benztropine Mesylate 1 MG Oral Tablet; TAKE 1 TABLET AT BEDTIME; Therapy: 64WWO1395 to (Evaluate:98Qys4756); Last Rx:36Dzh5339 Ordered   2  Lithium Carbonate  MG Oral Tablet Extended Release; Take 1 tablet twice daily; Therapy: 15SGJ9390 to (Last Rx:04Oct2017) Ordered   3  OLANZapine 5 MG Oral Tablet; TAKE 1 TABLET AT BEDTIME; Therapy: 02KXN0554 to (Evaluate:88Opd8893); Last Rx:04Oct2017 Ordered    Family Psych History  Mother    1  No pertinent family history    Social History    · Never a smoker    End of Encounter Meds    1  Benztropine Mesylate 1 MG Oral Tablet; TAKE 1 TABLET AT BEDTIME; Therapy: 83BMX6457 to (Evaluate:94Amm4065); Last Rx:48Jaj9087 Ordered   2  Lithium Carbonate  MG Oral Tablet Extended Release; Take 1 tablet twice daily; Therapy: 23ERZ8278 to (Last Rx:04Oct2017) Ordered   3  OLANZapine 5 MG Oral Tablet; TAKE 1 TABLET AT BEDTIME; Therapy: 61TJI6264 to (Evaluate:17Ulv9221);  Last Rx:04Oct2017 Ordered    Future Appointments    Date/Time Provider Specialty Site   12/27/2017 04:10 PM Rebekah Casanova MD Psychiatry [facility]     Signatures   Electronically signed by : Siena Gonzalez, [facility]; Oct  9 2017  3:16PM EST                       (Author)    Electronically signed by : Solange Feng MD; Oct 23 2017  8:05AM EST

## 2018-01-15 NOTE — PSYCH
Psych Med Mgmt    Appearance: was calm and cooperative, adequate hygiene and grooming, demonstrated behavior psychomotor retardation and good eye contact  Observed mood: anxious  Observed mood: affect was flat  Speech: decreased volume, but a normal rate  Thought processes: coherent/organized  Hallucinations: no hallucinations present  Thought Content: ideas of reference   occasional    Abnormal Thoughts: The patient has no suicidal thoughts and no homicidal thoughts  Orientation: The patient is oriented to person, place and time  Recent and Remote Memory: short term memory intact and long term memory intact  Attention Span And Concentration: concentration impaired  Insight: Insight intact  Judgment: His judgment was intact  Muscle Strength And Tone  Muscle strength and tone were normal  mild EPS  The patient is experiencing no localized pain  He reports normal appetite, normal energy level, no weight change and normal number of sleep hours  Leonardo Monson was seen for continuing care and pharmacotherapy  There has been no change in his clinical status and he remains anxious with little motivation  Flat affect and mild EPS persist and he also has dry mouth and blurred vision which are related to side effects of Cogentin  Vitals  Signs [Data Includes: Current Encounter]   Recorded: [id number] 03:23PM   Weight: 190 lb   BMI Calculated: 27 26  BSA Calculated: 2 04    Assessment    1  Bipolar I disorder, current or most recent episode manic, severe with atypical features   (296 43) (F31 13)    Plan    1  Benztropine Mesylate 2 MG Oral Tablet; TAKE 1 TABLET DAILY AS DIRECTED   2  Lithium Carbonate  MG Oral Tablet Extended Release; Take 1 tablet   twice daily   3  OLANZapine 5 MG Oral Tablet; 1 po qhs    Review of Systems    Constitutional: No fever, no chills, feels well, no tiredness, no recent weight gain or loss     Cardiovascular: no complaints of slow or fast heart rate, no chest pain, no palpitations  Respiratory: no complaints of shortness of breath, no wheezing, no dyspnea on exertion  Gastrointestinal: Dry mouth  Genitourinary: urinary frequency  Musculoskeletal: no complaints of arthralgia, no myalgias, no limb pain, no joint stiffness  Integumentary: no complaints of skin rash, no itching, no dry skin  Neurological: no complaints of headache, no confusion, no numbness, no dizziness  Other Symptoms: Blurred vision  Active Problems    1  Bipolar I disorder, current or most recent episode manic, severe with atypical features   (296 43) (F31 13)   2  Encounter for medication monitoring (P41 83) (Z19 81)    Allergies    1  No Known Drug Allergies    Current Meds   1  Benztropine Mesylate 2 MG Oral Tablet; TAKE 1 TABLET DAILY AS DIRECTED; Therapy: 35PKV7397 to (Last Rx:09Mar2016) Ordered   2  Lithium Carbonate  MG Oral Tablet Extended Release; Take 1 tablet twice daily; Therapy: 45LLP7765 to (Renew:04Mar2017); Last Rx:09Mar2016 Ordered   3  OLANZapine 5 MG Oral Tablet; 1 po qhs;   Therapy: 50XOS7129 to (Renew:04Mar2017); Last Rx:09Mar2016 Ordered    Family Psych History    1  No pertinent family history    Social History    · Never a smoker    End of Encounter Meds    1  Benztropine Mesylate 2 MG Oral Tablet; TAKE 1 TABLET DAILY AS DIRECTED; Therapy: 38DVO9543 to (Last Rx:28Mar2016) Ordered   2  Lithium Carbonate  MG Oral Tablet Extended Release; Take 1 tablet twice daily; Therapy: 58DGY6691 to (Renew:23Mar2017); Last Rx:28Mar2016 Ordered   3  OLANZapine 5 MG Oral Tablet; 1 po qhs;   Therapy: 41WQP1052 to (Renew:23Mar2017);  Last Rx:28Mar2016 Ordered    Future Appointments    Date/Time Provider Specialty Site   04/26/2016 03:10 PM Nikolai Vazquez MD Psychiatry [facility]   05/24/2016 03:10 PM Nikolai Vazquez MD Psychiatry [facility]   06/27/2016 03:10 PM Nikolai Vazquez MD Psychiatry [facility]   08/29/2016 03:10 PM Rae Lawton MD Psychiatry [facility]   09/27/2016 03:10 PM Rae Lawton MD Psychiatry [facility]   10/31/2016 03:10 PM Rae Lawton MD Psychiatry [facility]   11/22/2016 03:10 PM Rae Lawton MD Psychiatry [facility]   12/19/2016 03:30 PM Rae Lawton MD Psychiatry [facility]   07/20/2016 09:30 AM Armen Morfin [facility] Psychiatry [facility]     Signatures   Electronically signed by : Sunil Yepez MD; Mar 28 2016  3:34PM EST                       (Author)

## 2018-01-16 NOTE — PSYCH
Psych Med Mgmt    Appearance: was calm and cooperative and demonstrated behavior psychomotor retardation  Observed mood: was dysphoric  Observed mood: affect was flat  Speech: decreased volume, but a normal rate  Thought processes: coherent/organized  Hallucinations: no hallucinations present  Thought Content: no delusions  Abnormal Thoughts: The patient has no suicidal thoughts and no homicidal thoughts  Orientation: The patient is oriented to person, place and time  Recent and Remote Memory: short term memory intact and long term memory intact  Attention Span And Concentration: concentration impaired  Insight: Insight intact  Judgment: His judgment was intact  The patient is experiencing no localized pain  Treatment Recommendations: We will continue with lithium, Zyprexa and Cogentin  He will continue to see his psychotherapist twice a month I will see him next month  Risks, Benefits And Possible Side Effects Of Medications: Risks, benefits, and possible side effects of medications explained to patient and patient verbalizes understanding  The patient was seen for continuing care and pharmacotherapy  Lithium level is 0 6  He continues to have bradykinesia and a flat affect and nasal voice which I believe is all related to side effects of his medications  I have recommended permanent disability  His relationship with his wife is a stable  Level of the stress is high because they live with mother-in-law  Assessment    1  Bipolar I disorder, current or most recent episode manic, severe with atypical features   (296 43) (F31 13)    Review of Systems    Constitutional: No fever, no chills, feels well, no tiredness, no recent weight gain or loss  Cardiovascular: no complaints of slow or fast heart rate, no chest pain, no palpitations  Respiratory: no complaints of shortness of breath, no wheezing, no dyspnea on exertion     Gastrointestinal: no complaints of abdominal pain, no constipation, no nausea, no diarrhea, no vomiting  Genitourinary: no complaints of dysuria, no incontinence, no pelvic pain, no urinary frequency  Musculoskeletal: no complaints of arthralgia, no myalgias, no limb pain, no joint stiffness  Integumentary: no complaints of skin rash, no itching, no dry skin  Neurological: no complaints of headache, no confusion, no numbness, no dizziness  Active Problems    1  Bipolar I disorder, current or most recent episode manic, severe with atypical features   (296 43) (F31 13)   2  Encounter for medication monitoring (V58 83) (Z51 81)    Allergies    1  No Known Drug Allergies    Current Meds   1  Benztropine Mesylate 2 MG Oral Tablet; take 1 tablet by mouth every day; Therapy: [id number] to (Evaluate:07Mar2016); Last Rx:38Mgh1563 Ordered   2  Lithium Carbonate  MG Oral Tablet Extended Release; Take 1 tablet twice daily; Therapy: 52FGC5119 to (Evaluate:07Mar2016); Last Rx:62Swi1775 Ordered   3  OLANZapine 5 MG Oral Tablet; TAKE 1 TABLET AT BEDTIME; Therapy: 18WGF7035 to (Evaluate:07Mar2016); Last Rx:19Rbd4904 Ordered    Family Psych History    1  No pertinent family history    Social History    · Never a smoker    End of Encounter Meds    1  Benztropine Mesylate 2 MG Oral Tablet; take 1 tablet by mouth every day; Therapy: 10YHT7051 to (Evaluate:07Mar2016); Last Rx:14Ycz3260 Ordered   2  Lithium Carbonate  MG Oral Tablet Extended Release; Take 1 tablet twice daily; Therapy: 26QMA9898 to (Evaluate:07Mar2016); Last Rx:93Gar6014 Ordered   3  OLANZapine 5 MG Oral Tablet; TAKE 1 TABLET AT BEDTIME; Therapy: 01IZU4003 to (Evaluate:07Mar2016);  Last Rx:63Noz5807 Ordered    Future Appointments    Date/Time Provider Specialty Site   02/29/2016 04:50 PM Samuel Castano MD Psychiatry [facility]   03/28/2016 03:10 PM Samuel Castano MD Psychiatry [facility]   04/26/2016 03:10 PM Samuel Castano MD Psychiatry [facility]   05/24/2016 03:10 PM Micky Griffin MD Psychiatry [facility]   06/27/2016 03:10 PM Micky Griffin MD Psychiatry [facility]   07/26/2016 03:10 PM Micky Griffin MD Psychiatry [facility]   08/29/2016 03:10 PM Micky Griffin MD Psychiatry [facility]   09/27/2016 03:10 PM Micky Griffin MD Psychiatry [facility]   10/31/2016 03:10 PM Micky Griffin MD Psychiatry [facility]   11/22/2016 03:10 PM [facility] [address] [facility]     Signatures   Electronically signed by : Miensh Looney MD; Jan 12 2016  3:30PM EST                       (Author)

## 2018-01-18 NOTE — MISCELLANEOUS
Message   Recorded as Task   Date: 03/02/2016 04:43 PM, Created By: Patricia Sewell   Task Name: Med Renewal Request   Assigned To: Devante Lowe   Regarding Patient: Justin Cuba, Status: Active   CommentHillary Beericki - 02 Mar 2016 4:43 PM     TASK CREATED  Pt called asking to p/u scripts for all three of his meds  he wants to pick them up on monday  Please write up for all three for pt  Cuco Lyle - 05 Mar 2016 5:21 PM     TASK EDITED   Patient called requesting refills      Plan  Bipolar I disorder, current or most recent episode manic, severe with atypical features    · Benztropine Mesylate 2 MG Oral Tablet; take 1 tablet by mouth every day   · Lithium Carbonate  MG Oral Tablet Extended Release;  Take 1 tablet  twice daily   · OLANZapine 5 MG Oral Tablet; TAKE 1 TABLET AT BEDTIME    Signatures   Electronically signed by : Malena Magdaleno MD; Mar  2 2016  5:24PM EST                       (Author)

## 2018-01-18 NOTE — PSYCH
Psych Med Mgmt    Appearance: was calm and cooperative, adequate hygiene and grooming and good eye contact  Observed mood: euthymic  Observed mood: affect was blunted  Speech: a normal rate and fluent  Thought processes: coherent/organized  Hallucinations: no hallucinations present  Thought Content: no delusions  Abnormal Thoughts: The patient has no suicidal thoughts and no homicidal thoughts  Orientation: The patient is oriented to person, place and time  Recent and Remote Memory: short term memory intact and long term memory intact  Attention Span And Concentration: concentration intact  Insight: Insight intact  Judgment: His judgment was intact  Muscle Strength And Tone  Muscle strength and tone were normal  Normal gait and station  Language: no difficulty naming common objects  Fund of knowledge: Patient displays adequate knowledge of current events  The patient is experiencing no localized pain  Treatment Recommendations: continue with same meds and RTO in 3 months  Risks, Benefits And Possible Side Effects Of Medications: Risks, benefits, and possible side effects of medications explained to patient and patient verbalizes understanding  He reports normal appetite, normal energy level, no weight change and normal number of sleep hours  seen for bipolar disorder  Remains in remission  No psychotic experiences  Sleeping and eating well  No side effects with meds  CMP is WNL  He is getting along well with his family  Assessment    1  Bipolar I disorder, current or most recent episode manic, severe with atypical features   (296 43) (F31 13)    Plan    1  Benztropine Mesylate 1 MG Oral Tablet; TAKE 1 TABLET AT BEDTIME   2  Lithium Carbonate  MG Oral Tablet Extended Release; Take 1 tablet   twice daily   3   OLANZapine 5 MG Oral Tablet; TAKE 1 TABLET AT BEDTIME    Review of Systems    Constitutional: No fever, no chills, feels well, no tiredness, no recent weight gain or loss  Cardiovascular: no complaints of slow or fast heart rate, no chest pain, no palpitations  Respiratory: no complaints of shortness of breath, no wheezing, no dyspnea on exertion  Gastrointestinal: no complaints of abdominal pain, no constipation, no nausea, no diarrhea, no vomiting  Genitourinary: no complaints of dysuria, no incontinence, no pelvic pain, no urinary frequency  Musculoskeletal: no complaints of arthralgia, no myalgias, no limb pain, no joint stiffness  Integumentary: no complaints of skin rash, no itching, no dry skin  Neurological: no complaints of headache, no confusion, no numbness, no dizziness  Active Problems    1  Bipolar I disorder, current or most recent episode manic, severe with atypical features   (296 43) (F31 13)   2  Encounter for long-term (current) use of high-risk medication (V58 69) (Z79 899)   3  Encounter for medication monitoring (V58 83) (Z51 81)    Allergies    1  No Known Drug Allergies    Current Meds   1  Benztropine Mesylate 1 MG Oral Tablet; TAKE 1 TABLET AT BEDTIME; Therapy: 02AWR4997 to (Evaluate:30Jul2017); Last Rx:31Jan2017 Ordered   2  Lithium Carbonate  MG Oral Tablet Extended Release; Take 1 tablet twice daily; Therapy: 21PFX5078 to (Last Rx:31Jan2017) Ordered   3  OLANZapine 5 MG Oral Tablet; TAKE 1 TABLET AT BEDTIME; Therapy: 26WSK4454 to (Evaluate:30Jul2017); Last Rx:30Jan12 Ordered    Family Psych History  Mother    1  No pertinent family history    Social History    · Never a smoker    End of Encounter Meds    1  Benztropine Mesylate 1 MG Oral Tablet; TAKE 1 TABLET AT BEDTIME; Therapy: 95RRU3967 to (Evaluate:06Jan2018); Last Rx:05Ryr4890 Ordered   2  Lithium Carbonate  MG Oral Tablet Extended Release; Take 1 tablet twice daily; Therapy: 49VEN9691 to (Last Rx:10Jul2017) Ordered   3  OLANZapine 5 MG Oral Tablet; TAKE 1 TABLET AT BEDTIME; Therapy: 57GNW1160 to (Evaluate:06Jan2018);  Last Rx:10Jul2017 Ordered    Signatures   Electronically signed by : Kely Jonse MD; Jul 10 2017  3:25PM EST                       (Author)

## 2018-01-23 NOTE — PSYCH
Psych Med Mgmt    Appearance: was calm and cooperative, adequate hygiene and grooming and good eye contact  Observed mood: euthymic  Observed mood: affect was blunted, but affect appropriate  Speech: a normal rate and speech soft   improving  Thought processes: coherent/organized  Hallucinations: no hallucinations present  Thought Content: no delusions  Abnormal Thoughts: The patient has no suicidal thoughts and no homicidal thoughts  Orientation: The patient is oriented to person, place and time  Recent and Remote Memory: short term memory intact and long term memory intact  Attention Span And Concentration: concentration impaired  Insight: Insight intact  Judgment: His judgment was intact  Muscle Strength And Tone  Muscle strength and tone were normal  Normal gait and station  The patient is experiencing no localized pain  Treatment Recommendations: continue with same meds and RTO in a few months  Risks, Benefits And Possible Side Effects Of Medications: Risks, benefits, and possible side effects of medications explained to patient and patient verbalizes understanding  He reports normal appetite, normal energy level, no weight change and normal number of sleep hours  Patient was seen for bipolar disorder  He remains in remission and does not have any complaints  He spends most of his time reading and was wondering if he could go back to work on a very limited basis  I pointed out to him that under the circumstances, his main source of income should be his disability income  The pattern of his illness has been unpredictable and he has generally responded poorly to stress and increased demands on him  Last lithium level was 0  6  His his speech is no longer slurred since he has been taking Cogentin at bedtime  Assessment    1   Bipolar I disorder, current or most recent episode manic, severe with atypical features   (296 43) (F31 13)    Review of Systems    Constitutional: No fever, no chills, feels well, no tiredness, no recent weight gain or loss  Cardiovascular: no complaints of slow or fast heart rate, no chest pain, no palpitations  Respiratory: no complaints of shortness of breath, no wheezing, no dyspnea on exertion  Gastrointestinal: no complaints of abdominal pain, no constipation, no nausea, no diarrhea, no vomiting  Genitourinary: no complaints of dysuria, no incontinence, no pelvic pain, no urinary frequency  Musculoskeletal: no complaints of arthralgia, no myalgias, no limb pain, no joint stiffness  Integumentary: no complaints of skin rash, no itching, no dry skin  Neurological: no complaints of headache, no confusion, no numbness, no dizziness  Active Problems    1  Bipolar I disorder, current or most recent episode manic, severe with atypical features   (296 43) (F31 13)   2  Encounter for long-term (current) use of high-risk medication (V58 69) (Z79 899)   3  Encounter for medication monitoring (V58 83) (Z51 81)    Allergies    1  No Known Drug Allergies    Current Meds   1  Benztropine Mesylate 1 MG Oral Tablet; TAKE 1 TABLET AT BEDTIME; Therapy: 33OJJ8922 to (Evaluate:53Ljz5504); Last Rx:04Oct2017 Ordered   2  Lithium Carbonate  MG Oral Tablet Extended Release; Take 1 tablet twice daily; Therapy: 42YAW7802 to (Last Rx:04Oct2017) Ordered   3  OLANZapine 5 MG Oral Tablet; TAKE 1 TABLET AT BEDTIME; Therapy: 91WXC3357 to (Evaluate:49Clt7788); Last Rx:04Oct2017 Ordered    Family Psych History  Mother    1  No pertinent family history    Social History    · Never a smoker    End of Encounter Meds    1  Benztropine Mesylate 1 MG Oral Tablet; TAKE 1 TABLET AT BEDTIME; Therapy: 37SEH9131 to (Evaluate:98Bqq8733); Last Rx:04Oct2017 Ordered   2  Lithium Carbonate  MG Oral Tablet Extended Release; Take 1 tablet twice daily; Therapy: 01DWC8942 to (Last Rx:04Oct2017) Ordered   3   OLANZapine 5 MG Oral Tablet; TAKE 1 TABLET AT BEDTIME; Therapy: 61FXO8132 to (Evaluate:03Cmz0338);  Last Rx:04Oct2017 Ordered    Signatures   Electronically signed by : Georgeana Carrel, MD; Dec 27 2017  4:34PM EST                       (Author)

## 2018-03-22 ENCOUNTER — OFFICE VISIT (OUTPATIENT)
Dept: PSYCHIATRY | Facility: CLINIC | Age: 53
End: 2018-03-22
Payer: COMMERCIAL

## 2018-03-22 DIAGNOSIS — F31.13 BIPOLAR I DISORDER, CURRENT OR MOST RECENT EPISODE MANIC, SEVERE WITH ATYPICAL FEATURES (HCC): Primary | ICD-10-CM

## 2018-03-22 PROCEDURE — 99213 OFFICE O/P EST LOW 20 MIN: CPT | Performed by: PSYCHIATRY & NEUROLOGY

## 2018-03-22 RX ORDER — BENZTROPINE MESYLATE 1 MG/1
1 TABLET ORAL
COMMUNITY
Start: 2015-07-23 | End: 2018-06-25 | Stop reason: SDUPTHER

## 2018-03-22 RX ORDER — LITHIUM CARBONATE 450 MG
1 TABLET, EXTENDED RELEASE ORAL 2 TIMES DAILY
COMMUNITY
Start: 2015-07-23 | End: 2018-06-25 | Stop reason: SDUPTHER

## 2018-03-22 RX ORDER — OLANZAPINE 5 MG/1
1 TABLET ORAL
COMMUNITY
Start: 2015-07-23 | End: 2018-06-25 | Stop reason: SDUPTHER

## 2018-03-22 NOTE — PSYCH
Patient ID: Marialuisa Feliz is a 50 y o  male  HPI ROS Appetite Changes and Sleep: normal appetite, normal energy level, no weight change and normal number of sleep hours    Review Of Systems:     Mood Normal   Thought Content Normal   General Normal    Gastrointestinal Normal   Neurological Normal        Laboratory Results: No results found for this or any previous visit  Subjective:    Doing well on meds  Financially strained and needs to find a part time job  No manic or psychotic symptoms  Gets along well with his family  No health problems       Objective:     Seems to be at baseline  Needs lithium level,BMP and TSH  Mental status:  Appearance calm and cooperative , adequate hygiene and grooming and good eye contact    Mood euthymic   Affect affect was blunted   Speech a normal rate and fluent   Thought Processes normal thought processes   Hallucinations no hallucinations present    Thought Content no delusional thoughts verbalized   Abnormal Thoughts no suicidal thoughts  and no homicidal thoughts    Orientation A+O x 3   Remote Memory short term memory intact and long term memory intact   Attention Span concentration intact   Intellect Not Formally Assessed   Insight Insight intact   Judgement judgment was intact   Muscle Strength Muscle strength and tone were normal and Normal gait    Language no difficulty naming common objects and no difficulty repeating a phrase    Pain none       Assessment/Plan:       Diagnoses and all orders for this visit:    Bipolar I disorder, current or most recent episode manic, severe with atypical features ([facility] [address] )    Other orders  -     benztropine (COGENTIN) 1 mg tablet; Take 1 tablet by mouth daily at bedtime  -     lithium carbonate (LITHOBID) 450 mg CR tablet; Take 1 tablet by mouth 2 (two) times a day  -     OLANZapine (ZyPREXA) 5 mg tablet;  Take 1 tablet by mouth daily at bedtime        No prescriptions needed    Treatment Recommendations- Risks Benefits Risks, Benefits And Possible Side Effects Of Medications:  Risks, benefits, and possible side effects of medications explained to patient and patient verbalizes understanding

## 2018-06-19 LAB
BUN SERPL-MCNC: 11 MG/DL (ref 7–25)
BUN/CREAT SERPL: NORMAL (CALC) (ref 6–22)
CALCIUM SERPL-MCNC: 9.3 MG/DL (ref 8.6–10.3)
CHLORIDE SERPL-SCNC: 108 MMOL/L (ref 98–110)
CO2 SERPL-SCNC: 27 MMOL/L (ref 20–31)
CREAT SERPL-MCNC: 0.98 MG/DL (ref 0.7–1.33)
GLUCOSE SERPL-MCNC: 91 MG/DL (ref 65–99)
LITHIUM SERPL-SCNC: 0.6 MMOL/L (ref 0.6–1.2)
POTASSIUM SERPL-SCNC: 3.9 MMOL/L (ref 3.5–5.3)
SL AMB EGFR AFRICAN AMERICAN: 104 ML/MIN/1.73M2
SL AMB EGFR NON AFRICAN AMERICAN: 90 ML/MIN/1.73M2
SODIUM SERPL-SCNC: 141 MMOL/L (ref 135–146)
TSH SERPL-ACNC: 0.83 MIU/L (ref 0.4–4.5)

## 2018-06-25 ENCOUNTER — OFFICE VISIT (OUTPATIENT)
Dept: PSYCHIATRY | Facility: CLINIC | Age: 53
End: 2018-06-25
Payer: COMMERCIAL

## 2018-06-25 DIAGNOSIS — F31.74 BIPOLAR DISORDER, IN FULL REMISSION, MOST RECENT EPISODE MANIC (HCC): Primary | ICD-10-CM

## 2018-06-25 PROCEDURE — 99213 OFFICE O/P EST LOW 20 MIN: CPT | Performed by: PSYCHIATRY & NEUROLOGY

## 2018-06-25 RX ORDER — BENZTROPINE MESYLATE 1 MG/1
1 TABLET ORAL
Qty: 90 TABLET | Refills: 3 | Status: SHIPPED | OUTPATIENT
Start: 2018-06-25 | End: 2018-08-06 | Stop reason: SDUPTHER

## 2018-06-25 RX ORDER — OLANZAPINE 5 MG/1
5 TABLET ORAL
Qty: 90 TABLET | Refills: 3 | Status: SHIPPED | OUTPATIENT
Start: 2018-06-25 | End: 2018-09-24 | Stop reason: SDUPTHER

## 2018-06-25 RX ORDER — LITHIUM CARBONATE 450 MG
450 TABLET, EXTENDED RELEASE ORAL 2 TIMES DAILY
Qty: 180 TABLET | Refills: 3 | Status: SHIPPED | OUTPATIENT
Start: 2018-06-25 | End: 2018-09-24 | Stop reason: SDUPTHER

## 2018-06-25 NOTE — PSYCH
Patient ID: Checo Mcclellan is a 50 y o  male  HPI ROS Appetite Changes and Sleep: normal appetite, normal energy level, no weight change and normal number of sleep hours    Review Of Systems:     Mood Normal   Thought Content Normal   General Normal    Gastrointestinal Normal   Neurological Normal        Laboratory Results: No results found for this or any previous visit  Subjective:    Sugey Drake does not have any specific complaints  He has remained in remission  Continues to have a flat affect and a mild degree of bradykinesia  No rigidity or tremor was noted  Sleeping and eating well      Objective:     Stable and in remission  Mental status:  Appearance calm and cooperative , adequate hygiene and grooming and good eye contact    Mood euthymic   Affect affect was flat   Speech Normal rate and Normal volume   Thought Processes coherent/organized   Hallucinations no hallucinations present    Thought Content no delusional thoughts verbalized   Abnormal Thoughts no suicidal thoughts  and no homicidal thoughts    Orientation A+O x 3   Memory function short term memory intact and long term memory intact   Attention Span concentration impaired   Intellect Not Formally Assessed   Insight Insight intact   Judgement judgment was intact   Muscle Strength Muscle strength and tone were normal and Normal gait    Language no difficulty naming common objects and no difficulty repeating a phrase    Pain none       Assessment/Plan:       Diagnoses and all orders for this visit:    Bipolar disorder, in full remission, most recent episode manic (HCC)  -     benztropine (COGENTIN) 1 mg tablet; Take 1 tablet (1 mg total) by mouth daily at bedtime  -     lithium carbonate (LITHOBID) 450 mg CR tablet; Take 1 tablet (450 mg total) by mouth 2 (two) times a day  -     OLANZapine (ZyPREXA) 5 mg tablet;  Take 1 tablet (5 mg total) by mouth daily at bedtime            Treatment Recommendations- Risks Benefits      Risks, Benefits And Possible Side Effects Of Medications:  Risks, benefits, and possible side effects of medications explained to patient and patient verbalizes understanding

## 2018-08-06 DIAGNOSIS — F31.74 BIPOLAR DISORDER, IN FULL REMISSION, MOST RECENT EPISODE MANIC (HCC): ICD-10-CM

## 2018-08-06 RX ORDER — BENZTROPINE MESYLATE 1 MG/1
TABLET ORAL
Qty: 90 TABLET | Refills: 1 | Status: SHIPPED | OUTPATIENT
Start: 2018-08-06 | End: 2018-09-24 | Stop reason: SDUPTHER

## 2018-09-24 ENCOUNTER — OFFICE VISIT (OUTPATIENT)
Dept: PSYCHIATRY | Facility: CLINIC | Age: 53
End: 2018-09-24
Payer: COMMERCIAL

## 2018-09-24 DIAGNOSIS — F31.74 BIPOLAR DISORDER, IN FULL REMISSION, MOST RECENT EPISODE MANIC (HCC): ICD-10-CM

## 2018-09-24 PROCEDURE — 99213 OFFICE O/P EST LOW 20 MIN: CPT | Performed by: PSYCHIATRY & NEUROLOGY

## 2018-09-24 RX ORDER — BENZTROPINE MESYLATE 1 MG/1
1 TABLET ORAL
Qty: 90 TABLET | Refills: 1
Start: 2018-09-24 | End: 2018-12-27 | Stop reason: SDUPTHER

## 2018-09-24 RX ORDER — LITHIUM CARBONATE 450 MG
450 TABLET, EXTENDED RELEASE ORAL 2 TIMES DAILY
Qty: 180 TABLET | Refills: 0
Start: 2018-09-24 | End: 2018-12-27 | Stop reason: SDUPTHER

## 2018-09-24 RX ORDER — OLANZAPINE 5 MG/1
5 TABLET ORAL
Qty: 90 TABLET | Refills: 0
Start: 2018-09-24 | End: 2018-12-27 | Stop reason: SDUPTHER

## 2018-09-24 NOTE — PSYCH
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  I told him that the priority for him should be financial stability and his good health and encouraged him to contact appropriate agencies for retraining and job placement for people who are on disability    Objective:   Stays in remission    Mental status:  Appearance calm and cooperative  and good eye contact    Mood euthymic   Affect affect was blunted and affect was constricted   Speech Normal rate and Normal volume   Thought Processes coherent/organized   Hallucinations no hallucinations present    Thought Content no delusional thoughts verbalized   Abnormal Thoughts no suicidal thoughts  and no homicidal thoughts    Orientation A+O x 3   Memory function Not tested   Attention Span concentration intact   Intellect Not Formally Assessed   Insight Insight intact   Judgement judgment was intact   Muscle Strength Muscle strength and tone were normal and Normal gait    Language no difficulty naming common objects and no difficulty repeating a phrase    Pain none       Assessment/Plan:       Diagnoses and all orders for this visit:    Bipolar disorder, in full remission, most recent episode manic (HCC)  -     benztropine (COGENTIN) 1 mg tablet; Take 1 tablet (1 mg total) by mouth daily at bedtime  -     lithium carbonate (LITHOBID) 450 mg CR tablet;  Take 1 tablet (450 mg total) by mouth 2 (two) times a day  -     OLANZapine (ZyPREXA) 5 mg tablet;  Take 1 tablet (5 mg total) by mouth daily at bedtime            Treatment Recommendations- Risks Benefits      Risks, Benefits And Possible Side Effects Of Medications:  Risks, benefits, and possible side effects of medications explained to patient and patient verbalizes understanding

## 2018-12-27 ENCOUNTER — OFFICE VISIT (OUTPATIENT)
Dept: PSYCHIATRY | Facility: CLINIC | Age: 53
End: 2018-12-27
Payer: COMMERCIAL

## 2018-12-27 DIAGNOSIS — F31.74 BIPOLAR DISORDER, IN FULL REMISSION, MOST RECENT EPISODE MANIC (HCC): ICD-10-CM

## 2018-12-27 PROCEDURE — 99213 OFFICE O/P EST LOW 20 MIN: CPT | Performed by: PSYCHIATRY & NEUROLOGY

## 2018-12-27 RX ORDER — LITHIUM CARBONATE 450 MG
450 TABLET, EXTENDED RELEASE ORAL 2 TIMES DAILY
Qty: 180 TABLET | Refills: 0
Start: 2018-12-27 | End: 2019-03-27 | Stop reason: SDUPTHER

## 2018-12-27 RX ORDER — BENZTROPINE MESYLATE 1 MG/1
1 TABLET ORAL
Qty: 90 TABLET | Refills: 0
Start: 2018-12-27 | End: 2019-03-27 | Stop reason: SDUPTHER

## 2018-12-27 RX ORDER — OLANZAPINE 5 MG/1
5 TABLET ORAL
Qty: 90 TABLET | Refills: 0
Start: 2018-12-27 | End: 2019-03-27

## 2018-12-27 NOTE — PSYCH
Patient ID: Ricardo Noel is a 48 y o  male  HPI ROS Appetite Changes and Sleep: normal appetite, normal energy level, no weight change and normal number of sleep hours    Review Of Systems:     Mood Normal   Thought Content Normal   General Normal    Gastrointestinal Normal   Neurological As Noted in HPI       Laboratory Results: No results found for this or any previous visit  Subjective:    Seen for bipolar disorder  Has remained in remission  Sleeping and eating well  Occasionally feels his mind is not as sharp as it used to be  No new health problems      Objective:     Remains in remission  Mental status:  Appearance calm and cooperative , adequate hygiene and grooming and good eye contact    Mood euthymic   Affect affect was blunted and affect appropriate    Speech Normal rate and Normal volume   Thought Processes coherent/organized   Hallucinations no hallucinations present    Thought Content no delusional thoughts verbalized   Abnormal Thoughts no suicidal thoughts  and no homicidal thoughts    Orientation A+O x 3   Memory function Not tested   Attention Span concentration impaired   Intellect Not Formally Assessed   Insight Insight intact   Judgement judgment was intact   Muscle Strength Muscle strength and tone were normal and Normal gait    Language no difficulty naming common objects and no difficulty repeating a phrase    Pain none       Assessment/Plan:       Diagnoses and all orders for this visit:    Bipolar disorder, in full remission, most recent episode manic (HCC)  -     benztropine (COGENTIN) 1 mg tablet; Take 1 tablet (1 mg total) by mouth daily at bedtime  -     lithium carbonate (LITHOBID) 450 mg CR tablet; Take 1 tablet (450 mg total) by mouth 2 (two) times a day  -     OLANZapine (ZyPREXA) 5 mg tablet; Take 1 tablet (5 mg total) by mouth daily at bedtime  -     Lithium level; Future  -     Basic metabolic panel;  Future  -     TSH, 3rd generation with Free T4 reflex; Future        Treatment Recommendations- Risks Benefits    Continue with same medications    Risks, Benefits And Possible Side Effects Of Medications:  Risks, benefits, and possible side effects of medications explained to patient and patient verbalizes understanding

## 2019-03-27 ENCOUNTER — OFFICE VISIT (OUTPATIENT)
Dept: PSYCHIATRY | Facility: CLINIC | Age: 54
End: 2019-03-27
Payer: COMMERCIAL

## 2019-03-27 DIAGNOSIS — F31.74 BIPOLAR DISORDER, IN FULL REMISSION, MOST RECENT EPISODE MANIC (HCC): ICD-10-CM

## 2019-03-27 PROCEDURE — 99213 OFFICE O/P EST LOW 20 MIN: CPT | Performed by: PSYCHIATRY & NEUROLOGY

## 2019-03-27 RX ORDER — LITHIUM CARBONATE 450 MG
450 TABLET, EXTENDED RELEASE ORAL 2 TIMES DAILY
Qty: 180 TABLET | Refills: 0
Start: 2019-03-27 | End: 2019-05-28 | Stop reason: SDUPTHER

## 2019-03-27 RX ORDER — BENZTROPINE MESYLATE 1 MG/1
1 TABLET ORAL
Qty: 90 TABLET | Refills: 0
Start: 2019-03-27 | End: 2019-06-27

## 2019-03-27 RX ORDER — OLANZAPINE 5 MG/1
5 TABLET ORAL
Qty: 90 TABLET | Refills: 0
Start: 2019-03-27 | End: 2019-05-28 | Stop reason: SDUPTHER

## 2019-03-27 NOTE — PSYCH
Patient ID: Kristi Connor is a 48 y o  male  HPI ROS Appetite Changes and Sleep: normal appetite, normal energy level, no weight change and normal number of sleep hours except for interruption due to bathroom trips  Review Of Systems:     Mood Normal   Thought Content Normal   General polyuria   Gastrointestinal Normal   Neurological Normal        Laboratory Results: No results found for this or any previous visit  Subjective:    Seen for bipolar disorder  No manic symptoms or paranoia  Getting along with his family  No new health problems  He no longer has a visible tremor but continues to have blunted affect  He did not have his blood work done and I urged him to do so  We talked about continuation therapy and he is afraid of any changes in his current regimen since he is stable  Objective: In remission with residual Parkinsonian features  Mental status:  Appearance calm and cooperative , adequate hygiene and grooming and good eye contact    Mood euthymic   Affect affect was blunted   Speech Soft and Normal rate   Thought Processes coherent/organized   Hallucinations no hallucinations present    Thought Content no delusional thoughts verbalized   Abnormal Thoughts no suicidal thoughts  and no homicidal thoughts    Orientation A+O x 3   Memory function Not tested   Attention Span concentration impaired   Intellect Not Formally Assessed   Insight Insight intact   Judgement judgment was intact   Muscle Strength Muscle strength and tone were normal and Normal gait    Language no difficulty naming common objects and no difficulty repeating a phrase    Pain none       Assessment/Plan:       Diagnoses and all orders for this visit:    Bipolar disorder, in full remission, most recent episode manic (HCC)  -     OLANZapine (ZyPREXA) 5 mg tablet; Take 1 tablet (5 mg total) by mouth daily at bedtime  -     lithium carbonate (LITHOBID) 450 mg CR tablet;  Take 1 tablet (450 mg total) by mouth 2 (two) times a day  -     benztropine (COGENTIN) 1 mg tablet; Take 1 tablet (1 mg total) by mouth daily at bedtime            Treatment Recommendations- Risks Benefits    Psychotherapy focused on his fears of having a relapse and also his need to remain compliant with blood work    I will see him in three months  Risks, Benefits And Possible Side Effects Of Medications:  Risks, benefits, and possible side effects of medications explained to patient and patient verbalizes understanding

## 2019-04-24 LAB
BUN SERPL-MCNC: 12 MG/DL (ref 7–25)
BUN/CREAT SERPL: ABNORMAL (CALC) (ref 6–22)
CALCIUM SERPL-MCNC: 9.2 MG/DL (ref 8.6–10.3)
CHLORIDE SERPL-SCNC: 111 MMOL/L (ref 98–110)
CO2 SERPL-SCNC: 25 MMOL/L (ref 20–32)
CREAT SERPL-MCNC: 0.93 MG/DL (ref 0.7–1.33)
GLUCOSE SERPL-MCNC: 100 MG/DL (ref 65–99)
LITHIUM SERPL-SCNC: 0.6 MMOL/L (ref 0.6–1.2)
POTASSIUM SERPL-SCNC: 4 MMOL/L (ref 3.5–5.3)
SL AMB EGFR AFRICAN AMERICAN: 110 ML/MIN/1.73M2
SL AMB EGFR NON AFRICAN AMERICAN: 95 ML/MIN/1.73M2
SODIUM SERPL-SCNC: 141 MMOL/L (ref 135–146)
TSH SERPL-ACNC: 2.68 MIU/L (ref 0.4–4.5)

## 2019-05-28 DIAGNOSIS — F31.74 BIPOLAR DISORDER, IN FULL REMISSION, MOST RECENT EPISODE MANIC (HCC): Primary | ICD-10-CM

## 2019-05-28 RX ORDER — LITHIUM CARBONATE 450 MG
TABLET, EXTENDED RELEASE ORAL
Qty: 180 TABLET | Refills: 0 | Status: SHIPPED | OUTPATIENT
Start: 2019-05-28 | End: 2019-06-27 | Stop reason: SDUPTHER

## 2019-05-28 RX ORDER — OLANZAPINE 5 MG/1
TABLET ORAL
Qty: 90 TABLET | Refills: 0 | Status: SHIPPED | OUTPATIENT
Start: 2019-05-28 | End: 2019-06-27 | Stop reason: SDUPTHER

## 2019-05-28 RX ORDER — BENZTROPINE MESYLATE 1 MG/1
TABLET ORAL
Qty: 90 TABLET | Refills: 1 | Status: SHIPPED | OUTPATIENT
Start: 2019-05-28 | End: 2019-06-27

## 2019-06-27 ENCOUNTER — OFFICE VISIT (OUTPATIENT)
Dept: PSYCHIATRY | Facility: CLINIC | Age: 54
End: 2019-06-27
Payer: COMMERCIAL

## 2019-06-27 VITALS
DIASTOLIC BLOOD PRESSURE: 84 MMHG | WEIGHT: 207 LBS | HEART RATE: 58 BPM | BODY MASS INDEX: 30.66 KG/M2 | SYSTOLIC BLOOD PRESSURE: 144 MMHG | HEIGHT: 69 IN

## 2019-06-27 DIAGNOSIS — Z79.899 ASSESSMENT OF EFFECTS OF PSYCHOTROPIC DRUG IN PATIENT AT RISK FOR METABOLIC SYNDROME: Primary | ICD-10-CM

## 2019-06-27 DIAGNOSIS — F31.74 BIPOLAR DISORDER, IN FULL REMISSION, MOST RECENT EPISODE MANIC (HCC): ICD-10-CM

## 2019-06-27 DIAGNOSIS — Z01.89 ASSESSMENT OF EFFECTS OF PSYCHOTROPIC DRUG IN PATIENT AT RISK FOR METABOLIC SYNDROME: Primary | ICD-10-CM

## 2019-06-27 PROCEDURE — 99213 OFFICE O/P EST LOW 20 MIN: CPT | Performed by: PSYCHIATRY & NEUROLOGY

## 2019-06-27 RX ORDER — LITHIUM CARBONATE 450 MG
450 TABLET, EXTENDED RELEASE ORAL 2 TIMES DAILY
Qty: 180 TABLET | Refills: 2 | Status: SHIPPED | OUTPATIENT
Start: 2019-06-27 | End: 2019-12-30 | Stop reason: SDUPTHER

## 2019-06-27 RX ORDER — OLANZAPINE 5 MG/1
5 TABLET ORAL
Qty: 90 TABLET | Refills: 3 | Status: SHIPPED | OUTPATIENT
Start: 2019-06-27 | End: 2019-12-30 | Stop reason: SDUPTHER

## 2019-06-27 RX ORDER — BENZTROPINE MESYLATE 1 MG/1
1 TABLET ORAL
Qty: 90 TABLET | Refills: 3 | Status: SHIPPED | OUTPATIENT
Start: 2019-06-27 | End: 2019-12-30 | Stop reason: SDUPTHER

## 2019-09-18 LAB
CHOLEST SERPL-MCNC: 141 MG/DL
CHOLEST/HDLC SERPL: 4 (CALC)
HDLC SERPL-MCNC: 35 MG/DL
LDLC SERPL CALC-MCNC: 84 MG/DL (CALC)
NONHDLC SERPL-MCNC: 106 MG/DL (CALC)
TRIGL SERPL-MCNC: 128 MG/DL

## 2019-09-30 ENCOUNTER — OFFICE VISIT (OUTPATIENT)
Dept: PSYCHIATRY | Facility: CLINIC | Age: 54
End: 2019-09-30
Payer: COMMERCIAL

## 2019-09-30 DIAGNOSIS — Z79.899 ASSESSMENT OF EFFECTS OF PSYCHOTROPIC DRUG IN PATIENT AT RISK FOR METABOLIC SYNDROME: ICD-10-CM

## 2019-09-30 DIAGNOSIS — Z01.89 ASSESSMENT OF EFFECTS OF PSYCHOTROPIC DRUG IN PATIENT AT RISK FOR METABOLIC SYNDROME: ICD-10-CM

## 2019-09-30 DIAGNOSIS — F31.74 BIPOLAR DISORDER, IN FULL REMISSION, MOST RECENT EPISODE MANIC (HCC): Primary | ICD-10-CM

## 2019-09-30 PROCEDURE — 99213 OFFICE O/P EST LOW 20 MIN: CPT | Performed by: PSYCHIATRY & NEUROLOGY

## 2019-09-30 NOTE — PSYCH
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 but turned them down  He was concerned about high level of stress  Home situation is stable and he has a good relationship with his family      Objective:     Remains in remission  Mental status:  Appearance calm and cooperative , adequate hygiene and grooming and good eye contact    Mood euthymic   Affect affect was broad   Speech Normal rate and Normal volume   Thought Processes coherent/organized   Hallucinations no hallucinations present    Thought Content no delusional thoughts verbalized   Abnormal Thoughts no suicidal thoughts  and no homicidal thoughts    Orientation A+O x 3   Memory function Not tested   Attention Span concentration intact   Intellect Not Formally Assessed   Insight Insight intact   Judgement judgment was intact   Muscle Strength Muscle strength and tone were normal and Normal gait    Language no difficulty naming common objects, no difficulty repeating a phrase  and no difficulty writing a sentence    Pain none       Assessment/Plan:       Diagnoses and all orders for this visit:    Bipolar disorder, in full remission, most recent episode manic ([address] )  -     Basic metabolic panel; Future    Assessment of effects of psychotropic drug in patient at risk for metabolic syndrome  -     Basic metabolic panel; Future        1   Bipolar disorder, in full remission, most recent episode manic ([facility] [address] )    2  Assessment of effects of psychotropic drug in patient at risk for metabolic syndrome        Treatment Recommendations- Risks Benefits    Lipid profile was reviewed with him  Other than a slightly low HDL, his other numbers are great  I also ordered BMP and lithium level    We talked about potential risks of long-term lithium therapy but he acknowledges that lithium has been quite helpful with him and he has not tolerated being off of that  Risks, Benefits And Possible Side Effects Of Medications:  Risks, benefits, and possible side effects of medications explained to patient and patient verbalizes understanding

## 2019-12-30 ENCOUNTER — OFFICE VISIT (OUTPATIENT)
Dept: PSYCHIATRY | Facility: CLINIC | Age: 54
End: 2019-12-30
Payer: COMMERCIAL

## 2019-12-30 DIAGNOSIS — F31.74 BIPOLAR DISORDER, IN FULL REMISSION, MOST RECENT EPISODE MANIC (HCC): Primary | ICD-10-CM

## 2019-12-30 PROCEDURE — 99213 OFFICE O/P EST LOW 20 MIN: CPT | Performed by: PSYCHIATRY & NEUROLOGY

## 2019-12-30 RX ORDER — OLANZAPINE 5 MG/1
5 TABLET ORAL
Qty: 90 TABLET | Refills: 3 | Status: SHIPPED | OUTPATIENT
Start: 2019-12-30 | End: 2020-03-30 | Stop reason: SDUPTHER

## 2019-12-30 RX ORDER — BENZTROPINE MESYLATE 1 MG/1
1 TABLET ORAL
Qty: 90 TABLET | Refills: 3 | Status: SHIPPED | OUTPATIENT
Start: 2019-12-30 | End: 2020-03-30 | Stop reason: SDUPTHER

## 2019-12-30 RX ORDER — LITHIUM CARBONATE 450 MG
450 TABLET, EXTENDED RELEASE ORAL 2 TIMES DAILY
Qty: 180 TABLET | Refills: 2 | Status: SHIPPED | OUTPATIENT
Start: 2019-12-30 | End: 2020-03-30 | Stop reason: SDUPTHER

## 2019-12-30 NOTE — PSYCH
Patient ID: Bo Dumont is a 48 y o  male  Review Of Systems:     Mood Euthymic   Thought Content Normal   General As HPI   Physical symptoms As Noted in HPI       Laboratory Results: No results found for this or any previous visit  Subjective: The patient was seen for follow-up treatment of bipolar disorder  Since his last visit, there has been no change in his clinical status  His home life is stable  He has not been able to find a job that would suit his needs and limitations  His sleep and appetite are normal  He has not had any hallucinations or paranoia  He did not have blood work done      Objective:     Stable and in full remission  Mental status:  Appearance calm and cooperative , adequate hygiene and grooming and good eye contact    Mood euthymic   Affect affect was broad and affect appropriate    Speech Normal rate and Normal volume   Thought Processes coherent/organized   Hallucinations no hallucinations present    Thought Content no delusional thoughts verbalized   Abnormal Thoughts no suicidal thoughts  and no homicidal thoughts    Orientation A+O x 3       Assessment/Plan:       Diagnoses and all orders for this visit:    Bipolar disorder, in full remission, most recent episode manic ([facility])  -     benztropine (COGENTIN) 1 mg tablet; Take 1 tablet (1 mg total) by mouth daily at bedtime  -     lithium carbonate (LITHOBID) 450 mg CR tablet; Take 1 tablet (450 mg total) by mouth 2 (two) times a day  -     OLANZapine (ZyPREXA) 5 mg tablet; Take 1 tablet (5 mg total) by mouth daily at bedtime  -     Lithium level; Future  -     Basic metabolic panel; Future        1  Bipolar disorder, in full remission, most recent episode manic ([facility] [address] )        Treatment Recommendations- Risks Benefits    I advised him to have the blood test done    We will continue with same medications and I will see him in 3 months  Risks, Benefits And Possible Side Effects Of Medications:  Risks, benefits, and possible side effects of medications explained to patient and patient verbalizes understanding

## 2020-01-28 LAB
BUN SERPL-MCNC: 11 MG/DL (ref 7–25)
BUN/CREAT SERPL: NORMAL (CALC) (ref 6–22)
CALCIUM SERPL-MCNC: 9.5 MG/DL (ref 8.6–10.3)
CHLORIDE SERPL-SCNC: 108 MMOL/L (ref 98–110)
CO2 SERPL-SCNC: 29 MMOL/L (ref 20–32)
CREAT SERPL-MCNC: 1 MG/DL (ref 0.7–1.33)
GLUCOSE SERPL-MCNC: 91 MG/DL (ref 65–99)
LITHIUM SERPL-SCNC: 0.6 MMOL/L (ref 0.6–1.2)
POTASSIUM SERPL-SCNC: 4.2 MMOL/L (ref 3.5–5.3)
SL AMB EGFR AFRICAN AMERICAN: 100 ML/MIN/1.73M2
SL AMB EGFR NON AFRICAN AMERICAN: 86 ML/MIN/1.73M2
SODIUM SERPL-SCNC: 142 MMOL/L (ref 135–146)

## 2020-03-30 ENCOUNTER — TELEMEDICINE (OUTPATIENT)
Dept: PSYCHIATRY | Facility: CLINIC | Age: 55
End: 2020-03-30
Payer: COMMERCIAL

## 2020-03-30 DIAGNOSIS — F31.74 BIPOLAR DISORDER, IN FULL REMISSION, MOST RECENT EPISODE MANIC (HCC): ICD-10-CM

## 2020-03-30 PROCEDURE — 99212 OFFICE O/P EST SF 10 MIN: CPT | Performed by: PSYCHIATRY & NEUROLOGY

## 2020-03-30 RX ORDER — BENZTROPINE MESYLATE 1 MG/1
1 TABLET ORAL
Qty: 90 TABLET | Refills: 3
Start: 2020-03-30 | End: 2021-01-25 | Stop reason: SDUPTHER

## 2020-03-30 RX ORDER — LITHIUM CARBONATE 450 MG
450 TABLET, EXTENDED RELEASE ORAL 2 TIMES DAILY
Qty: 180 TABLET | Refills: 2
Start: 2020-03-30 | End: 2021-01-25 | Stop reason: SDUPTHER

## 2020-03-30 RX ORDER — OLANZAPINE 5 MG/1
5 TABLET ORAL
Qty: 90 TABLET | Refills: 3
Start: 2020-03-30 | End: 2021-01-25 | Stop reason: SDUPTHER

## 2020-03-30 NOTE — PSYCH
Virtual Brief Visit    Problem List Items Addressed This Visit     None          [unfilled]     Reason for visit is follow-up    Encounter provider Stephanie Miramontes MD    Provider located at [facility]      [unfilled]     After connecting through telephone, the patient was identified by name and date of birth  Ra Sarah was informed that this is a telemedicine visit and that the visit is being conducted through telephone  My office door was closed  No one else was in the room  He acknowledged consent and understanding of privacy and security of the video platform  The patient has agreed to participate and understands they can discontinue the visit at any time  Patient is aware this is a billable service  Subjective  Ra Sarah is a 48 y o  male   Doing well and has no specific complaints  He has been sleeping and eating well  Tries to exercise at home  Lithium level is 0 6 and BMP is normal   He did not verbalize delusional material   He reports that his tremor has improved significantly  He sounds stable    No past medical history on file  No past surgical history on file  Current Outpatient Medications   Medication Sig Dispense Refill    benztropine (COGENTIN) 1 mg tablet Take 1 tablet (1 mg total) by mouth daily at bedtime 90 tablet 3    lithium carbonate (LITHOBID) 450 mg CR tablet Take 1 tablet (450 mg total) by mouth 2 (two) times a day 180 tablet 2    OLANZapine (ZyPREXA) 5 mg tablet Take 1 tablet (5 mg total) by mouth daily at bedtime 90 tablet 3     No current facility-administered medications for this visit  Allergies not on file    Review of Systems    Sleep and appetite are normal  I spent 10 minutes with the patient during this visit

## 2020-06-18 ENCOUNTER — TELEMEDICINE (OUTPATIENT)
Dept: PSYCHIATRY | Facility: CLINIC | Age: 55
End: 2020-06-18
Payer: COMMERCIAL

## 2020-06-18 DIAGNOSIS — F31.74 BIPOLAR DISORDER, IN FULL REMISSION, MOST RECENT EPISODE MANIC (HCC): Primary | ICD-10-CM

## 2020-06-18 PROCEDURE — 99441 PR PHYS/QHP TELEPHONE EVALUATION 5-10 MIN: CPT | Performed by: PSYCHIATRY & NEUROLOGY

## 2020-09-21 ENCOUNTER — OFFICE VISIT (OUTPATIENT)
Dept: PSYCHIATRY | Facility: CLINIC | Age: 55
End: 2020-09-21
Payer: COMMERCIAL

## 2020-09-21 DIAGNOSIS — Z79.899 ASSESSMENT OF EFFECTS OF PSYCHOTROPIC DRUG IN PATIENT AT RISK FOR METABOLIC SYNDROME: ICD-10-CM

## 2020-09-21 DIAGNOSIS — F31.74 BIPOLAR DISORDER, IN FULL REMISSION, MOST RECENT EPISODE MANIC (HCC): Primary | ICD-10-CM

## 2020-09-21 DIAGNOSIS — Z01.89 ASSESSMENT OF EFFECTS OF PSYCHOTROPIC DRUG IN PATIENT AT RISK FOR METABOLIC SYNDROME: ICD-10-CM

## 2020-09-21 PROCEDURE — 99214 OFFICE O/P EST MOD 30 MIN: CPT | Performed by: PHYSICIAN ASSISTANT

## 2020-09-21 RX ORDER — TESTOSTERONE 4 MG/D
PATCH TRANSDERMAL
COMMUNITY
Start: 2020-08-10

## 2020-09-21 NOTE — PSYCH
PROGRESS NOTE        [facility]      Name and Date of Birth:  Clarissa Cutler 48 y o  1965    Date of Visit: 09/21/20    SUBJECTIVE:  Patient seen for follow-up of bipolar disorder and medication check  Patient overall reports that he has been holding steady  Denies any recent depressive episodes or manic episodes  Feels overall at his baseline with current medications and treatment plan  Sleeping well through the night and appetite is good  No suicidal or homicidal ideation  No auditory or visual hallucinations  Does note a mild hand tremor usually with writing but is not bothersome  No tardive dyskinesia noted  Reports that he has good interest and motivation and enjoys reading and exercising  Denies any adverse effects with the medications other than the mild tremor  Medication list was reviewed and updated  No new medications are medical issues  He denies suicidal ideation, intent or plan at present, has no suicidal ideation, intent or plan at present  He denies any auditory hallucinations and visual hallucinations, denies any other delusional thinking, denies any delusional thinking  [place] HPI ROS Appetite Changes and Sleep: normal appetite, normal sleep    Review Of Systems:      Constitutional Negative   ENT Negative   Cardiovascular Negative   Respiratory Negative   Gastrointestinal Negative   Genitourinary Negative   Musculoskeletal Negative   Integumentary Negative   Neurological Negative   Endocrine Negative   Other Symptoms Negative and None       Laboratory Results: No results found for this or any previous visit  Substance Abuse History:    Social History     Substance and Sexual Activity   Drug Use Not on file       Family Psychiatric History:     No family history on file      The following portions of the patient's history were reviewed and updated as appropriate: past family history, past medical history, past social history, past surgical history and problem list     Social History     Socioeconomic History    Marital status: /Civil Union     Spouse name: Not on file    Number of children: Not on file    Years of education: Not on file    Highest education level: Not on file   Occupational History    Not on file   Social Needs    Financial resource strain: Not on file    Food insecurity     Worry: Not on file     Inability: Not on file    Transportation needs     Medical: Not on file     Non-medical: Not on file   Tobacco Use    Smoking status: Not on file   Substance and Sexual Activity    Alcohol use: Not on file    Drug use: Not on file    Sexual activity: Not on file   Lifestyle    Physical activity     Days per week: Not on file     Minutes per session: Not on file    Stress: Not on file   Relationships    Social connections     Talks on phone: Not on file     Gets together: Not on file     Attends Oriental orthodox service: Not on file     Active member of club or organization: Not on file     Attends meetings of clubs or organizations: Not on file     Relationship status: Not on file    Intimate partner violence     Fear of current or ex partner: Not on file     Emotionally abused: Not on file     Physically abused: Not on file     Forced sexual activity: Not on file   Other Topics Concern    Not on file   Social History Narrative    Not on file     Social History     Social History Narrative    Not on file        Social History    None             OBJECTIVE:     Mental Status Evaluation:    Appearance age appropriate, casually dressed   Behavior pleasant, cooperative   Speech normal volume, normal pitch   Mood Euthymic   Affect Congruent   Thought Processes logical   Associations intact associations   Thought Content normal   Perceptual Disturbances: none   Abnormal Thoughts  Risk Potential Suicidal ideation - None  Homicidal ideation - None  Potential for aggression - No   Orientation oriented to person, place, time/date and situation   Memory recent and remote memory grossly intact   Cosciousness alert and awake   Attention Span attention span and concentration are age appropriate   Intellect Appears to be of Average Intelligence   Insight age appropriate    Judgement good    Muscle Strength and  Gait muscle strength and tone were normal  Mild hand tremor   Language no difficulty naming common objects   Fund of Knowledge displays adequate knowledge of current events   Pain none   Pain Scale 0       Assessment/Plan:       Diagnoses and all orders for this visit:    Bipolar disorder, in full remission, most recent episode manic ([facility] [address] )  -     Lithium level; Future  -     Basic metabolic panel; Future    Assessment of effects of psychotropic drug in patient at risk for metabolic syndrome  -     Lithium level; Future  -     Basic metabolic panel; Future    Other orders  -     Androderm 4 MG/24HR PT24; PLACE 1 PATCH DAILY          Treatment Recommendations/Precautions:  Lithobid 450 mg p o  B i d   Olanzapine 5 mg q h s  Cogentin 1 mg q h s  Lithium level 0 6 on January 27, 2020  Slip given for lithium level and BMP  Patient will follow up in 3-4 months    Continue current medications    Risks/Benefits      Risks, Benefits And Possible Side Effects Of Medications:    Risks, benefits, and possible side effects of medications explained to patient and patient verbalizes understanding and agreement for treatment      Controlled Medication Discussion:     Not applicable    Psychotherapy Provided:     Individual psychotherapy provided: No

## 2020-09-21 NOTE — BH TREATMENT PLAN
TREATMENT PLAN (Medication Management Only)        [facility]    Name and Date of Birth:  Hood Wynn 48 y o  1965  Date of Treatment Plan: September 21, 2020  Diagnosis/Diagnoses:    1  Bipolar disorder, in full remission, most recent episode manic ([address] )    2  Assessment of effects of psychotropic drug in patient at risk for metabolic syndrome      Strengths/Personal Resources for Self-Care: taking medications as prescribed  Area/Areas of need (in own words): "Continue stable moods"  1  Long Term Goal: maintain mood stability  Target Date:3 months - 12/21/2020  Person/Persons responsible for completion of goal: Samuel Robb  2  Short Term Objective (s) - How will we reach this goal?:   A  Provider new recommended medication/dosage changes and/or continue medication(s): continue current medications as prescribed  Cruz Favre blood work  C  N/A  Target Date:3 months - 12/21/2020  Person/Persons Responsible for Completion of Goal: Samuel Robb  Progress Towards Goals: stable  Treatment Modality: medication management every 3 months  Review due 180 days from date of this plan: Six months  Expected length of service: maintenance  My Physician/PA/NP and I have developed this plan together and I agree to work on the goals and objectives  I understand the treatment goals that were developed for my treatment

## 2021-01-25 ENCOUNTER — OFFICE VISIT (OUTPATIENT)
Dept: PSYCHIATRY | Facility: CLINIC | Age: 56
End: 2021-01-25
Payer: COMMERCIAL

## 2021-01-25 VITALS — WEIGHT: 217 LBS | BODY MASS INDEX: 32.14 KG/M2 | HEIGHT: 69 IN | RESPIRATION RATE: 20 BRPM

## 2021-01-25 DIAGNOSIS — Z01.89 ASSESSMENT OF EFFECTS OF PSYCHOTROPIC DRUG IN PATIENT AT RISK FOR METABOLIC SYNDROME: ICD-10-CM

## 2021-01-25 DIAGNOSIS — Z79.899 ASSESSMENT OF EFFECTS OF PSYCHOTROPIC DRUG IN PATIENT AT RISK FOR METABOLIC SYNDROME: ICD-10-CM

## 2021-01-25 DIAGNOSIS — F31.74 BIPOLAR DISORDER, IN FULL REMISSION, MOST RECENT EPISODE MANIC (HCC): Primary | ICD-10-CM

## 2021-01-25 PROCEDURE — 99214 OFFICE O/P EST MOD 30 MIN: CPT | Performed by: PHYSICIAN ASSISTANT

## 2021-01-25 RX ORDER — OLANZAPINE 5 MG/1
5 TABLET ORAL
Qty: 90 TABLET | Refills: 3 | Status: SHIPPED | OUTPATIENT
Start: 2021-01-25 | End: 2021-09-09 | Stop reason: SDUPTHER

## 2021-01-25 RX ORDER — LITHIUM CARBONATE 450 MG
TABLET, EXTENDED RELEASE ORAL
Qty: 180 TABLET | Refills: 3 | Status: SHIPPED | OUTPATIENT
Start: 2021-01-25 | End: 2021-09-09 | Stop reason: SDUPTHER

## 2021-01-25 RX ORDER — BENZTROPINE MESYLATE 1 MG/1
1 TABLET ORAL
Qty: 90 TABLET | Refills: 3 | Status: SHIPPED | OUTPATIENT
Start: 2021-01-25 | End: 2021-02-08 | Stop reason: SDUPTHER

## 2021-01-25 NOTE — PSYCH
PROGRESS NOTE        [facility]am [facility]      Name and Date of Birth:  Minesh Magallanes 50 y o  1965    Date of Visit: 01/25/21    SUBJECTIVE:  Patient seen for follow-up of bipolar disorder and medication check  Patient overall reports that he has been doing well with his current combination of medications  Sleeping and eating adequately  Has adequate interest and motivation, enjoys reading  Only side effect with medication is slight tremor with writing  Denies any other adverse effects an overall reports that he is feeling well  No significant depressive episodes  No anxiety attacks  No manic episodes  Medically he is feeling well and no new medications or medical issues  We reviewed his blood work and noted glucose to be 105  Discussed increasing physical activity which patient will try to do  States he does go for walks when the weather is warmer  He has an indoor bike which he will try to utilize on days he cannot get outside to walk  He denies suicidal ideation, intent or plan at present, has no suicidal ideation, intent or plan at present  He denies any auditory hallucinations and visual hallucinations, denies any other delusional thinking, denies any delusional thinking  Medication side effect noted above  HPI ROS Appetite Changes and Sleep: normal appetite, normal sleep    Review Of Systems:      Constitutional Negative   ENT Negative   Cardiovascular Negative   Respiratory Negative   Gastrointestinal Negative   Genitourinary Negative   Musculoskeletal Negative   Integumentary Negative   Neurological Negative   Endocrine Negative   Other Symptoms Negative and None       Laboratory Results: No results found for this or any previous visit  Substance Abuse History:    Social History     Substance and Sexual Activity   Drug Use Not on file       Family Psychiatric History:     No family history on file      The following portions of the patient's history were reviewed and updated as appropriate: past family history, past medical history, past social history, past surgical history and problem list     Social History     Socioeconomic History    Marital status: /Civil Union     Spouse name: Not on file    Number of children: Not on file    Years of education: Not on file    Highest education level: Not on file   Occupational History    Not on file   Social Needs    Financial resource strain: Not on file    Food insecurity     Worry: Not on file     Inability: Not on file    Transportation needs     Medical: Not on file     Non-medical: Not on file   Tobacco Use    Smoking status: Not on file   Substance and Sexual Activity    Alcohol use: Not on file    Drug use: Not on file    Sexual activity: Not on file   Lifestyle    Physical activity     Days per week: Not on file     Minutes per session: Not on file    Stress: Not on file   Relationships    Social connections     Talks on phone: Not on file     Gets together: Not on file     Attends Alevism service: Not on file     Active member of club or organization: Not on file     Attends meetings of clubs or organizations: Not on file     Relationship status: Not on file    Intimate partner violence     Fear of current or ex partner: Not on file     Emotionally abused: Not on file     Physically abused: Not on file     Forced sexual activity: Not on file   Other Topics Concern    Not on file   Social History Narrative    Not on file     Social History     Social History Narrative    Not on file        Social History    None             OBJECTIVE:     Mental Status Evaluation:    Appearance age appropriate, casually dressed   Behavior pleasant, cooperative   Speech normal volume, normal pitch   Mood Euthymic   Affect Congruent   Thought Processes logical   Associations intact associations   Thought Content normal   Perceptual Disturbances: none   Abnormal Thoughts  Risk Potential Suicidal ideation - None  Homicidal ideation - None  Potential for aggression - No   Orientation oriented to person, place, time/date and situation   Memory recent and remote memory grossly intact   Cosciousness alert and awake   Attention Span attention span and concentration are age appropriate   Intellect Not formally assessed   Insight age appropriate    Judgement good    Muscle Strength and  Gait Steady gait   Language Within normal limits   Fund of Knowledge displays adequate knowledge of current events   Pain none   Pain Scale 0       Assessment/Plan:       Diagnoses and all orders for this visit:    Bipolar disorder, in full remission, most recent episode manic (HCC)  -     benztropine (COGENTIN) 1 mg tablet; Take 1 tablet (1 mg total) by mouth daily at bedtime  -     OLANZapine (ZyPREXA) 5 mg tablet; Take 1 tablet (5 mg total) by mouth daily at bedtime  -     lithium carbonate (LITHOBID) 450 mg CR tablet; 2 po qhs    Assessment of effects of psychotropic drug in patient at risk for metabolic syndrome          Treatment Recommendations/Precautions:  Lithobid 450 mg b i d -change to two at bedtime due to more consistent blood levels when dosed once a day and decrease chance for renal toxicity with daily dosing  Also reviewed importance of avoiding ibuprofen type medications and diuretics well on lithium, importance of adequate fluid intake  Lithium level 0 7 on January 8, 2021   BMP also reviewed from January 8, 2021, Glucose level was mildly elevated 105, we discussed increasing physical activity, exercise  Continue Olanzapine 5 mg HS  Continue Cogentin 1 mg at HS  Follow-up in three months with Dr Marleta Duane      Risks/Benefits      Risks, Benefits And Possible Side Effects Of Medications:    Risks, benefits, and possible side effects of medications explained to patient and patient verbalizes understanding and agreement for treatment      Controlled Medication Discussion:     Not applicable    Psychotherapy Provided:     Individual psychotherapy provided: No PIYUSH

## 2021-02-04 ENCOUNTER — TELEPHONE (OUTPATIENT)
Dept: PSYCHIATRY | Facility: CLINIC | Age: 56
End: 2021-02-04

## 2021-02-04 NOTE — TELEPHONE ENCOUNTER
Naldo Anglin sees [facility],     at his last appt  She gave him a printed script for cogentin for him to mail to [facility]      He said he mailed it but [facility] claims it was lost, and they will need a new one to fill  Patient is aware that [facility] is out until next week  Samson Wong would you be able to check the [facility] website to make sure this med has not been filled already, and if not send an E-script to [facility]

## 2021-02-08 DIAGNOSIS — F31.74 BIPOLAR DISORDER, IN FULL REMISSION, MOST RECENT EPISODE MANIC (HCC): ICD-10-CM

## 2021-02-08 RX ORDER — BENZTROPINE MESYLATE 1 MG/1
1 TABLET ORAL
Qty: 90 TABLET | Refills: 3 | Status: SHIPPED | OUTPATIENT
Start: 2021-02-08 | End: 2021-09-09 | Stop reason: SDUPTHER

## 2021-03-02 ENCOUNTER — OFFICE VISIT (OUTPATIENT)
Dept: PSYCHIATRY | Facility: CLINIC | Age: 56
End: 2021-03-02
Payer: COMMERCIAL

## 2021-03-02 DIAGNOSIS — F31.74 BIPOLAR DISORDER, IN FULL REMISSION, MOST RECENT EPISODE MANIC (HCC): Primary | ICD-10-CM

## 2021-03-02 PROCEDURE — 99215 OFFICE O/P EST HI 40 MIN: CPT | Performed by: PSYCHIATRY & NEUROLOGY

## 2021-03-02 NOTE — PSYCH
MEDICATION MANAGEMENT NOTE        [facility]      Name and Date of Birth:  Kamari Mercedes 50 y o  1965 MRN: [id number]    Date of Visit: March 2, 2021    Reason for Visit:Follow-up for medication management  The patient is transfer from his previous psychiatrist Dr Leandra Liriano  Subjective: this is a 65-year-old  male, , has 63-year-old daughter, currently lives with his wife and daughter in [place], [place]  The patient is currently on disability  The patient has been diagnosed with bipolar disorder  Type 1, currently in full remission  The patient has been following with his previous psychiatrist for last 30 years  He reports he had 3 psychiatric inpatient admissions in the past all for manic episodes  The patient last admission was 3-4 years back  He currently is on lithium  mg 2 tablets at bedtime, olanzapine 5 mg at bedtime, Cogentin 1 mg at bedtime and melatonin 5 mg at bedtime  The patient reports he has been doing well on the current medication  the patient presently reports mood has been stable and denies any recent depressive, manic episode  Denies any anxiety nowadays  Reports sleep has been good and takes melatonin up to 5 mg at bedtime  Reports sleeping 7-8 hours at night without any difficulty  Denies any nightmares  Reports appetite, energy level and focus has been good  Does not endorse anhedonia and reports enjoying reading and exercise  Denies any suicidal thoughts or any hopelessness nowadays  Denies any history of any suicide attempt ever in the past   The patient denies any concern  Reports experiences some mild tremors when he has to write something but otherwise denies  On examination today the patient was not noticed to have any tremors      The patient reports history of 3 manic episodes in the past   Reports 1st 1 was after he graduated from his master's degree when he was 27year-old  The patient describes his symptoms of manic episode in the past including feeling very euphoric, elevated self-esteem and grandiosity, not sleeping, rapid speech, running away from home at night  He reports he usually would be hospitalized after he would struggle with those symptoms and would take up to 2-3 weeks to do well and stable  He also reported he would experience hallucinations both visual and auditory during the episodes  He also reported feeling of unreal and being in a video game when he experiences episodes like that  Also endorses paranoia during those time  He denies any auditory or visual hallucination in general when he is not having manic episode  Does not endorse any paranoia or delusional ideation  Denies any history of any depressive episodes in the past but reported there will be some times when he might feel low specially after resolution of manic episode  Denies any history of any suicide attempt  Denies any struggle with panic attacks or social anxiety  Denies any history of any eating disorder  Reports history of being on multiple psychiatric medication in the past but does not recall the names  From the record it seems patient also was on Seroquel in the past  Denies any history of suicide attempt or any physical violence  Does not report seeing therapist ever in the past       Family history is significant for his cousin attempting suicide and being in a psychiatric inpatient unit multiple times  Reports his mother was alcoholic  The patient reports he drinks alcohol occasionally once a month  Denies any history of alcohol abuse  Denies any substance abuse  Reported history of briefly abusing cocaine, hallucinogens and marijuana but last use was 30 years back  Denies  any history of smoking  Denies drinking coffee regularly  The patient was born and raised in [address] [facility] [address] [facility]  Reports childhood was good    Patient has degree in master's in business administration  Reported had worked on and off in the past 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 which she believes was due to his mental health issues  Has been on disability for last 5 years  The patient is  and has 15year-old daughter  Reports very supportive wife and family in general   Lives in [place] with his wife and daughter  Denies any legal problem ever in the past        Review Of Systems:      Constitutional negative   ENT negative   Cardiovascular negative   Respiratory negative   Gastrointestinal negative   Genitourinary negative   Musculoskeletal negative   Integumentary negative   Neurological negative   Endocrine negative   Other Symptoms none       Alcohol/Substance Abuse:  Check HPI  Past Medical History:    History reviewed  No pertinent past medical history  No past medical history pertinent negatives  No past surgical history on file  No Known Allergies    Current Medications:       Current Outpatient Medications:     Androderm 4 MG/24HR PT24, PLACE 1 PATCH DAILY, Disp: , Rfl:     benztropine (COGENTIN) 1 mg tablet, Take 1 tablet (1 mg total) by mouth daily at bedtime, Disp: 90 tablet, Rfl: 3    lithium carbonate (LITHOBID) 450 mg CR tablet, 2 po qhs, Disp: 180 tablet, Rfl: 3    OLANZapine (ZyPREXA) 5 mg tablet, Take 1 tablet (5 mg total) by mouth daily at bedtime, Disp: 90 tablet, Rfl: 3       History Review: The following portions of the patient's history were reviewed and updated as appropriate: allergies, current medications, past family history, past medical history, past social history, past surgical history and problem list          OBJECTIVE:     Vital signs in last 24 hours: There were no vitals filed for this visit      Mental Status Evaluation:    Appearance age appropriate, casually dressed   Behavior cooperative, calm   Speech normal rate, normal volume, normal pitch   Mood euthymic   Affect blunted   Thought Processes organized, goal directed   Associations intact associations   Thought Content no overt delusions   Perceptual Disturbances: no auditory hallucinations, no visual hallucinations   Abnormal Thoughts  Risk Potential Suicidal ideation - None  Homicidal ideation - None  Potential for aggression - No   Orientation oriented to person, place, time/date and situation   Memory recent and remote memory grossly intact   Consciousness alert and awake   Attention Span Concentration Span attention span and concentration are age appropriate   Intellect appears to be of average intelligence   Insight intact   Judgement intact   Muscle Strength and  Gait normal gait and normal balance   Motor activity no abnormal movements   Language no difficulty naming common objects, no difficulty repeating a phrase, no difficulty writing a sentence   Fund of Knowledge adequate knowledge of current events  adequate fund of knowledge regarding past history  adequate fund of knowledge regarding vocabulary    Pain none   Pain Scale 0       Laboratory Results:   Patient last Lithium level checked was in January this year and was 0 7  He also underwent basic metabolic panel and was within normal range for S  creatinine  Most Recent Labs:   Lab Results   Component Value Date     06/12/2017    K 4 2 01/27/2020     01/27/2020    CO2 29 01/27/2020    BUN 11 01/27/2020    CREATININE 1 00 01/27/2020    CALCIUM 9 5 01/27/2020    AST 14 06/07/2016    ALT 14 06/07/2016    ALKPHOS 46 06/07/2016    PROT 6 8 06/07/2016    BILITOT 0 6 06/07/2016    CHOL 127 06/07/2016    HDL 35 (L) 09/18/2019    TRIG 128 09/18/2019    LDLCALC 84 09/18/2019    LITHIUM 0 6 01/27/2020     I have personally reviewed all pertinent laboratory/tests results  Assessment/Plan:  From evaluation of the patient today and review of his history, I concur with the diagnosis for bipolar disorder type 1 currently in full remission    The patient has been doing well on the current medication  No concerns reported except for mild tremor when writing  Probably due to lithium but overall patient has been significantly benefited from the medication  Plan is to continue with the current medication with no change  The patient has been advised to call us if there is any concern and to call crisis or visit nearby ER in case of any emergency  The patient was again educated in detail about his medication including benefit, risk, side effects, alternative, contraindication, dosage and frequency  Patient verbalized understanding and agrees with the plan  Diagnoses and all orders for this visit:    Bipolar disorder, in full remission, most recent episode manic ([address] )          Treatment Recommendations/Precautions:      Aware of 24 hour and weekend coverage for urgent situations accessed by calling [facility] main practice number    Risks/Benefits      Risks, Benefits And Possible Side Effects Of Medications:    Risks, benefits, and possible side effects of medications explained to Ashish Vaughn and he verbalizes understanding and agreement for treatment  Controlled Medication Discussion:     Not applicable    Psychotherapy Provided:     Individual psychotherapy provided: Medications, treatment progress and treatment plan reviewed with Ashish Vaughn  Medication education provided to Ahsish Vaughn  Reassurance and supportive therapy provided  Crisis/safety plan discussed with Ashish Vaughn       Treatment Plan;    Completed and signed during the session: Yes - Treatment Plan done but not signed at time of office visit due to:  Plan reviewed in person and verbal consent given due to COVID social distancing    Irma Cuba MD 03/02/21

## 2021-03-02 NOTE — BH TREATMENT PLAN
TREATMENT PLAN (Medication Management Only)        [address]    Name and Date of Birth:  Dunia Hughes y o  1965  Date of Treatment Plan: March 2, 2021  Diagnosis/Diagnoses:    1  Bipolar disorder, in full remission, most recent episode manic [facility])      Strengths/Personal Resources for Self-Care: supportive family, taking medications as prescribed, motivation for treatment, ability to negotiate basic needs  Area/Areas of need (in own words): mood swings  1  Long Term Goal: maintain stability of mood stability  Target Date:3 months - 6/2/2021  Person/Persons responsible for completion of goal: Blake Cisneros  2  Short Term Objective (s) - How will we reach this goal?:   A  Provider new recommended medication/dosage changes and/or continue medication(s): continue current medications as prescribed  B  N/A   C  N/A  Target Date:3 months - 6/2/2021  Person/Persons Responsible for Completion of Goal: Blake Cisneros  Progress Towards Goals: continuing treatment  Treatment Modality: medication management every 3 months  Review due 180 days from date of this plan: 4 months - 7/2/2021  Expected length of service: ongoing treatment  My Physician/PA/NP and I have developed this plan together and I agree to work on the goals and objectives  I understand the treatment goals that were developed for my treatment

## 2021-04-06 ENCOUNTER — IMMUNIZATIONS (OUTPATIENT)
Dept: FAMILY MEDICINE CLINIC | Facility: HOSPITAL | Age: 56
End: 2021-04-06

## 2021-04-06 DIAGNOSIS — Z23 ENCOUNTER FOR IMMUNIZATION: Primary | ICD-10-CM

## 2021-04-06 PROCEDURE — 0011A SARS-COV-2 / COVID-19 MRNA VACCINE (MODERNA) 100 MCG: CPT

## 2021-04-06 PROCEDURE — 91301 SARS-COV-2 / COVID-19 MRNA VACCINE (MODERNA) 100 MCG: CPT

## 2021-05-05 ENCOUNTER — IMMUNIZATIONS (OUTPATIENT)
Dept: FAMILY MEDICINE CLINIC | Facility: HOSPITAL | Age: 56
End: 2021-05-05

## 2021-05-05 DIAGNOSIS — Z23 ENCOUNTER FOR IMMUNIZATION: Primary | ICD-10-CM

## 2021-05-05 PROCEDURE — 0012A SARS-COV-2 / COVID-19 MRNA VACCINE (MODERNA) 100 MCG: CPT

## 2021-05-05 PROCEDURE — 91301 SARS-COV-2 / COVID-19 MRNA VACCINE (MODERNA) 100 MCG: CPT

## 2021-06-02 ENCOUNTER — OFFICE VISIT (OUTPATIENT)
Dept: PSYCHIATRY | Facility: CLINIC | Age: 56
End: 2021-06-02
Payer: COMMERCIAL

## 2021-06-02 DIAGNOSIS — F31.74 BIPOLAR DISORDER, IN FULL REMISSION, MOST RECENT EPISODE MANIC (HCC): Primary | ICD-10-CM

## 2021-06-02 PROCEDURE — 99213 OFFICE O/P EST LOW 20 MIN: CPT | Performed by: PSYCHIATRY & NEUROLOGY

## 2021-06-02 NOTE — PSYCH
MEDICATION MANAGEMENT NOTE        [facility]      Name and Date of Birth:  Salbador Mario 50 y o  1965 MRN: [id number]    Date of Visit: June 2, 2021    Reason for Visit:  Follow-up for medication management    Subjective: The patient reports he has been doing well  Denies any manic episode of depression since he last saw me  Reports mood has been stable  Denies any SI or HI  Reports sleep and appetite has been good  Reports good energy level  Reports normal concentration  Does not endorse anhedonia  Denies any anxiety or panic attack  Does not endorse any paranoia, delusional grandiose ideation  Denies any auditory or visual hallucination  Reports compliant with the medication and denies any side effect  On examination the patient was noted to have very minimal tremor of his left hand  No other abnormal involuntary movement noted  The patient reports he had done blood work recently  The lab result was not in the epic and he agreed to fax the result of the recent blood work  He though reported he had not done the serum lithium level and was given the lab slip for today to get it done  He denies any other medical issues      Review Of Systems:      Constitutional negative   ENT negative   Cardiovascular negative   Respiratory negative   Gastrointestinal negative   Genitourinary negative   Musculoskeletal negative   Integumentary negative   Neurological negative   Endocrine negative   Other Symptoms none       Alcohol/Substance Abuse:  Denies        Past Medical History:    No past medical history on file  No past medical history pertinent negatives  No past surgical history on file    No Known Allergies    Current Medications:       Current Outpatient Medications:     Androderm 4 MG/24HR PT24, PLACE 1 PATCH DAILY, Disp: , Rfl:     benztropine (COGENTIN) 1 mg tablet, Take 1 tablet (1 mg total) by mouth daily at bedtime, Disp: 90 tablet, Rfl: 3    lithium carbonate (LITHOBID) 450 mg CR tablet, 2 po qhs, Disp: 180 tablet, Rfl: 3    OLANZapine (ZyPREXA) 5 mg tablet, Take 1 tablet (5 mg total) by mouth daily at bedtime, Disp: 90 tablet, Rfl: 3       History Review: The following portions of the patient's history were reviewed and updated as appropriate: allergies, current medications, past family history, past medical history, past social history, past surgical history and problem list          OBJECTIVE:     Vital signs in last 24 hours: There were no vitals filed for this visit      Mental Status Evaluation:    Appearance age appropriate, casually dressed   Behavior cooperative, calm   Speech normal rate, normal volume, normal pitch   Mood euthymic   Affect normal range and intensity, appropriate   Thought Processes organized, goal directed   Associations intact associations   Thought Content no overt delusions   Perceptual Disturbances: no auditory hallucinations, no visual hallucinations   Abnormal Thoughts  Risk Potential Suicidal ideation - None  Homicidal ideation - None  Potential for aggression - No   Orientation oriented to person, place, time/date and situation   Memory recent and remote memory grossly intact   Consciousness alert and awake   Attention Span Concentration Span attention span and concentration are age appropriate   Intellect appears to be of average intelligence   Insight intact   Judgement intact   Muscle Strength and  Gait normal gait and normal balance   Motor activity no abnormal movements   Language no difficulty naming common objects, no difficulty repeating a phrase, no difficulty writing a sentence   Fund of Knowledge adequate knowledge of current events  adequate fund of knowledge regarding past history  adequate fund of knowledge regarding vocabulary    Pain none   Pain Scale 0       Laboratory Results:   Most Recent Labs:   Lab Results   Component Value Date     06/12/2017    K 4 2 01/27/2020     01/27/2020    CO2 29 01/27/2020    BUN 11 01/27/2020    CREATININE 1 00 01/27/2020    CALCIUM 9 5 01/27/2020    AST 14 06/07/2016    ALT 14 06/07/2016    ALKPHOS 46 06/07/2016    PROT 6 8 06/07/2016    BILITOT 0 6 06/07/2016    CHOL 127 06/07/2016    HDL 35 (L) 09/18/2019    TRIG 128 09/18/2019    LDLCALC 84 09/18/2019    LITHIUM 0 6 01/27/2020     I have personally reviewed all pertinent laboratory/tests results  Assessment/Plan:  Patient continues to do well  Denies any concern  Plan is to continue with the current medication with no changes  Patient has been very stable on it  The patient will follow-up with me in 3 months or sooner if needed  The patient was advised to call us if there is any concern and to call crisis or visit nearby ER in case of any emergency  The patient agreed  Diagnoses and all orders for this visit:    Bipolar disorder, in full remission, most recent episode manic ([address] )  -     Lithium level; Future          Treatment Recommendations/Precautions:      Aware of 24 hour and weekend coverage for urgent situations accessed by calling [facility] main practice number    Risks/Benefits      Risks, Benefits And Possible Side Effects Of Medications:    Risks, benefits, and possible side effects of medications explained to Destini Hernandez and he verbalizes understanding and agreement for treatment  Controlled Medication Discussion:     Not applicable    Psychotherapy Provided:     Individual psychotherapy provided: Medications, treatment progress and treatment plan reviewed with Destini Hernandez  Medication education provided to Destini Hernandez  Supportive therapy provided  Crisis/safety plan discussed with Destini Hernandez       Treatment Plan;    Completed and signed during the session: Not applicable - Treatment Plan not due at this session    Albania Hull MD 06/02/21

## 2021-09-09 ENCOUNTER — OFFICE VISIT (OUTPATIENT)
Dept: PSYCHIATRY | Facility: CLINIC | Age: 56
End: 2021-09-09
Payer: COMMERCIAL

## 2021-09-09 DIAGNOSIS — F31.74 BIPOLAR DISORDER, IN FULL REMISSION, MOST RECENT EPISODE MANIC (HCC): Primary | ICD-10-CM

## 2021-09-09 PROCEDURE — 99213 OFFICE O/P EST LOW 20 MIN: CPT | Performed by: PSYCHIATRY & NEUROLOGY

## 2021-09-09 RX ORDER — LITHIUM CARBONATE 450 MG
TABLET, EXTENDED RELEASE ORAL
Qty: 180 TABLET | Refills: 3 | Status: SHIPPED | OUTPATIENT
Start: 2021-09-09 | End: 2022-07-06

## 2021-09-09 RX ORDER — OLANZAPINE 5 MG/1
5 TABLET ORAL
Qty: 90 TABLET | Refills: 3 | Status: SHIPPED | OUTPATIENT
Start: 2021-09-09 | End: 2021-10-07 | Stop reason: SDUPTHER

## 2021-09-09 RX ORDER — BENZTROPINE MESYLATE 1 MG/1
1 TABLET ORAL
Qty: 90 TABLET | Refills: 3 | Status: SHIPPED | OUTPATIENT
Start: 2021-09-09 | End: 2022-07-06

## 2021-09-09 NOTE — PSYCH
MEDICATION MANAGEMENT NOTE        [facility]      Name and Date of Birth:  Monica Gamboa 50 y o  1965 MRN: [id number]    Date of Visit: September 9, 2021    Reason for Visit:  Follow-up for medication management    Subjective: The patient reports he has been doing well  Denies any changes or concerns since last visit 3 months back  Reports mood has been stable  Denies any manic or hypomanic episode  Reports sleep and appetite has been good  Denies any SI or HI or any hopelessness  Reports energy level and focus has been good  Denies any auditory or visual hallucination  Did not endorse any paranoia, delusional grandiose ideation during the meeting  Compliant with the medication and denies any side effect  Patient also did blood work for lithium level in July and it was 0 7  He also brought in previous blood work CMP, TSH and lipid panel and all were within normal range except for slightly elevated triglycerides at 168  He has not done the CBC this year and was given the lab slip for it  The patient also denies any other medical issues lately  Review Of Systems:      Constitutional negative   ENT negative   Cardiovascular negative   Respiratory negative   Gastrointestinal negative   Genitourinary negative   Musculoskeletal negative   Integumentary negative   Neurological negative   Endocrine negative   Other Symptoms none       Alcohol/Substance Abuse:  Denies        Past Medical History:    No past medical history on file  No past medical history pertinent negatives  No past surgical history on file    No Known Allergies    Current Medications:       Current Outpatient Medications:     Androderm 4 MG/24HR PT24, PLACE 1 PATCH DAILY, Disp: , Rfl:     benztropine (COGENTIN) 1 mg tablet, Take 1 tablet (1 mg total) by mouth daily at bedtime, Disp: 90 tablet, Rfl: 3    lithium carbonate (LITHOBID) 450 mg CR tablet, 2 po qhs, Disp: 180 tablet, Rfl: 3    OLANZapine (ZyPREXA) 5 mg tablet, Take 1 tablet (5 mg total) by mouth daily at bedtime, Disp: 90 tablet, Rfl: 3       History Review: The following portions of the patient's history were reviewed and updated as appropriate: allergies, current medications, past family history, past medical history, past social history, past surgical history and problem list          OBJECTIVE:     Vital signs in last 24 hours: There were no vitals filed for this visit      Mental Status Evaluation:    Appearance age appropriate, casually dressed   Behavior cooperative, calm   Speech normal rate, normal volume, normal pitch   Mood euthymic   Affect normal range and intensity, appropriate   Thought Processes organized, goal directed   Associations intact associations   Thought Content no overt delusions   Perceptual Disturbances: no auditory hallucinations, no visual hallucinations   Abnormal Thoughts  Risk Potential Suicidal ideation - None  Homicidal ideation - None  Potential for aggression - No   Orientation oriented to person, place, time/date and situation   Memory recent and remote memory grossly intact   Consciousness alert and awake   Attention Span Concentration Span attention span and concentration are age appropriate   Intellect appears to be of average intelligence   Insight intact   Judgement intact   Muscle Strength and  Gait normal gait and normal balance   Motor activity Mild to minimal tremor of both the hands   Language no difficulty naming common objects, no difficulty repeating a phrase   Fund of Knowledge adequate knowledge of current events  adequate fund of knowledge regarding past history  adequate fund of knowledge regarding vocabulary    Pain none   Pain Scale 0       Laboratory Results:   Most Recent Labs:   Lab Results   Component Value Date     06/12/2017    K 4 2 01/27/2020     01/27/2020    CO2 29 01/27/2020    BUN 11 01/27/2020    CREATININE 1 00 01/27/2020    CALCIUM 9 5 01/27/2020    AST 14 06/07/2016    ALT 14 06/07/2016    ALKPHOS 46 06/07/2016    PROT 6 8 06/07/2016    BILITOT 0 6 06/07/2016    CHOL 127 06/07/2016    HDL 35 (L) 09/18/2019    TRIG 128 09/18/2019    LDLCALC 84 09/18/2019    LITHIUM 0 6 01/27/2020     I have personally reviewed all pertinent laboratory/tests results  Assessment/Plan:  Patient continues to do well  Denies any concern  Plan is to continue with the current medication with no changes  Patient was educated about the medication including benefit, risk, side effects, alternative, contraindication, dosage and frequency  He verbalized understanding and agrees with the plan  He was advised to call us if there is any concern and to call crisis or visit nearby ER in case of any emergency  He was also given lab slip for getting CBC done before end of this year  Diagnoses and all orders for this visit:    Bipolar disorder, in full remission, most recent episode manic ([facility])  -     CBC and differential; Future  -     lithium carbonate (LITHOBID) 450 mg CR tablet; 2 po qhs  -     OLANZapine (ZyPREXA) 5 mg tablet; Take 1 tablet (5 mg total) by mouth daily at bedtime  -     benztropine (COGENTIN) 1 mg tablet; Take 1 tablet (1 mg total) by mouth daily at bedtime          Treatment Recommendations/Precautions:    Continue   Aware of 24 hour and weekend coverage for urgent situations accessed by calling [facility] main practice number    Risks/Benefits      Risks, Benefits And Possible Side Effects Of Medications:    Risks, benefits, and possible side effects of medications explained to Josh Mares and he verbalizes understanding and agreement for treatment  Controlled Medication Discussion:     Not applicable    Psychotherapy Provided:     Individual psychotherapy provided: Medications, treatment progress and treatment plan reviewed with Josh Mares  Medication education provided to Josh Mares    Crisis/safety plan discussed with Sugey Drake       Treatment Plan;    Completed and signed during the session: Yes - Treatment Plan done but not signed at time of office visit due to:  Plan reviewed in person and verbal consent given due to COVID social distancing    Jg Simmons MD 09/09/21

## 2021-09-09 NOTE — BH TREATMENT PLAN
TREATMENT PLAN (Medication Management Only)        [employer]    Name and Date of Birth:  Mony Hughes y o  1965    Date of Treatment Plan: September 9, 2021    Diagnosis/Diagnoses:     1  Bipolar disorder, in full remission, most recent episode manic [facility])      Strengths/Personal Resources for Self-Care: supportive family, taking medications as prescribed, ability to communicate needs  Area/Areas of need (in own words): mood swings  1  Long Term Goal: maintain control of manic symptoms  Target Date: 3 months - 12/9/2021  Person/Persons responsible for completion of goal: Kaila Cali    2  Short Term Objective (s) - How will we reach this goal?:     A  Provider new recommended medication/dosage changes and/or continue medication(s): continue current medications as prescribed  B   N/A     C   N/A  Target Date: 3 weeks - 9/30/2021  Person/Persons Responsible for Completion of Goal: Kaila Cali     Progress Towards Goals: continuing treatment    Treatment Modality: medication management every 3 months    Review due 90 to 120 days from date of this plan: 4 months - 1/9/2022  Expected length of service: ongoing treatment unless revised    My Physician/PA/NP and I have developed this plan together and I agree to work on the goals and objectives  I understand the treatment goals that were developed for my treatment    Treatment Plan done but not signed at time of office visit due to:  Plan reviewed by phone or in person  and verbal consent given due to [address] distancing      Signature:       Date and time:  Signature of parent/guardian if under age of 15 years: Date and time:  Signature of provider:      Date and time:  Signature of Supervising Physician:    Date and time: 9/9/2021      Rocio Bedoya MD

## 2021-10-04 ENCOUNTER — TELEPHONE (OUTPATIENT)
Dept: PSYCHIATRY | Facility: CLINIC | Age: 56
End: 2021-10-04

## 2021-10-04 NOTE — TELEPHONE ENCOUNTER
Kevin Gallego called to schedule an appointment for 10/7/21, he's been waking up with some hallucinations in between sleep, and believes he may need a med adjustment

## 2021-10-07 ENCOUNTER — OFFICE VISIT (OUTPATIENT)
Dept: PSYCHIATRY | Facility: CLINIC | Age: 56
End: 2021-10-07
Payer: COMMERCIAL

## 2021-10-07 DIAGNOSIS — F31.74 BIPOLAR DISORDER, IN FULL REMISSION, MOST RECENT EPISODE MANIC (HCC): ICD-10-CM

## 2021-10-07 PROCEDURE — 99213 OFFICE O/P EST LOW 20 MIN: CPT | Performed by: PSYCHIATRY & NEUROLOGY

## 2021-10-07 RX ORDER — OLANZAPINE 10 MG/1
10 TABLET ORAL
Qty: 90 TABLET | Refills: 0 | Status: SHIPPED | OUTPATIENT
Start: 2021-10-07 | End: 2021-12-06

## 2021-12-06 DIAGNOSIS — F31.74 BIPOLAR DISORDER, IN FULL REMISSION, MOST RECENT EPISODE MANIC (HCC): ICD-10-CM

## 2021-12-06 RX ORDER — OLANZAPINE 10 MG/1
TABLET ORAL
Qty: 90 TABLET | Refills: 3 | Status: SHIPPED | OUTPATIENT
Start: 2021-12-06

## 2021-12-09 ENCOUNTER — OFFICE VISIT (OUTPATIENT)
Dept: PSYCHIATRY | Facility: CLINIC | Age: 56
End: 2021-12-09
Payer: COMMERCIAL

## 2021-12-09 DIAGNOSIS — F31.74 BIPOLAR DISORDER, IN FULL REMISSION, MOST RECENT EPISODE MANIC (HCC): Primary | ICD-10-CM

## 2021-12-09 PROCEDURE — 99213 OFFICE O/P EST LOW 20 MIN: CPT | Performed by: PSYCHIATRY & NEUROLOGY

## 2022-03-10 ENCOUNTER — OFFICE VISIT (OUTPATIENT)
Dept: PSYCHIATRY | Facility: CLINIC | Age: 57
End: 2022-03-10
Payer: COMMERCIAL

## 2022-03-10 DIAGNOSIS — F31.74 BIPOLAR DISORDER, IN FULL REMISSION, MOST RECENT EPISODE MANIC (HCC): Primary | ICD-10-CM

## 2022-03-10 PROCEDURE — 99213 OFFICE O/P EST LOW 20 MIN: CPT | Performed by: PSYCHIATRY & NEUROLOGY

## 2022-03-10 NOTE — PSYCH
MEDICATION MANAGEMENT NOTE        [facility]      Name and Date of Birth:  Khari Baugh 49 y o  1965 MRN: [id number]    Date of Visit: March 10, 2022    Reason for Visit:  Follow-up for medication management  Subjective: The patient reports he has been doing well  Denies any concern or changes since last visit  Reports mood has been stable  Denies any depressive or manic episode  Denies any auditory or visual hallucination  Reports since increasing the dose of olanzapine did not had any hallucinations  Reports sleep, appetite energy level has been good  Reports compliant with the medication and denies any side effect  The patient had done the blood work last month and his lithium level was within normal range  His serum bilirubin, AST and ALT was elevated  Triglycerides were slightly higher and HDL slightly low but other lipid panel results were within normal range  The patient was advised to follow with her primary care physician soon to get further assessment for abnormal liver function  The patient was given copy of his lab results  The patient agreed to call his primary care doctor today  The patient also reports he is on Androderm patch for erectile dysfunction  Denies any other medical issues  Review Of Systems:      Constitutional negative   ENT negative   Cardiovascular negative   Respiratory negative   Gastrointestinal negative   Genitourinary negative   Musculoskeletal negative   Integumentary negative   Neurological negative   Endocrine negative   Other Symptoms none       Alcohol/Substance Abuse:  Denies        Past Medical History:    No past medical history on file  No past medical history pertinent negatives  No past surgical history on file    No Known Allergies    Current Medications:       Current Outpatient Medications:     Androderm 4 MG/24HR PT24, PLACE 1 PATCH DAILY, Disp: , Rfl:     benztropine (COGENTIN) 1 mg tablet, Take 1 tablet (1 mg total) by mouth daily at bedtime, Disp: 90 tablet, Rfl: 3    lithium carbonate (LITHOBID) 450 mg CR tablet, 2 po qhs, Disp: 180 tablet, Rfl: 3    OLANZapine (ZyPREXA) 10 mg tablet, TAKE 1 TABLET BY MOUTH  DAILY AT BEDTIME, Disp: 90 tablet, Rfl: 3       History Review: The following portions of the patient's history were reviewed and updated as appropriate: allergies, current medications, past family history, past medical history, past social history, past surgical history and problem list          OBJECTIVE:     Vital signs in last 24 hours: There were no vitals filed for this visit  Mental Status Evaluation:    Appearance age appropriate, casually dressed   Behavior cooperative, calm   Speech normal rate, normal volume, normal pitch   Mood normal   Affect normal range and intensity, appropriate   Thought Processes organized, goal directed   Associations intact associations   Thought Content no overt delusions   Perceptual Disturbances: no auditory hallucinations, no visual hallucinations   Abnormal Thoughts  Risk Potential Suicidal ideation - None  Homicidal ideation - None  Potential for aggression - No   Orientation oriented to person, place, time/date and situation   Memory recent and remote memory grossly intact   Consciousness alert and awake   Attention Span Concentration Span attention span and concentration are age appropriate   Intellect appears to be of average intelligence   Insight intact   Judgement intact   Muscle Strength and  Gait normal gait and normal balance   Motor activity no abnormal movements   Language no difficulty naming common objects, no difficulty repeating a phrase   Fund of Knowledge adequate knowledge of current events  adequate fund of knowledge regarding past history  adequate fund of knowledge regarding vocabulary    Pain none   Pain Scale 0       Laboratory Results:  Reviewed recent labs in the [facility]    Most Recent Labs:   Lab Results Component Value Date     06/12/2017    K 4 2 01/27/2020     01/27/2020    CO2 29 01/27/2020    BUN 11 01/27/2020    CREATININE 1 00 01/27/2020    CALCIUM 9 5 01/27/2020    AST 14 06/07/2016    ALT 14 06/07/2016    ALKPHOS 46 06/07/2016    PROT 6 8 06/07/2016    BILITOT 0 6 06/07/2016    CHOL 127 06/07/2016    CHOLESTEROL 141 09/18/2019    TRIG 128 09/18/2019    HDL 35 (L) 09/18/2019    LDLCALC 84 09/18/2019    NONHDLC 96 06/07/2016    LITHIUM 0 6 01/27/2020     Most Recent Labs ([facility]):   Lab Results   Component Value Date    SODIUM 142 01/27/2020    K 4 2 01/27/2020     01/27/2020    CO2 29 01/27/2020    BUN 11 01/27/2020    CREATININE 1 00 01/27/2020    GLUC 91 01/27/2020    CALCIUM 9 5 01/27/2020    AST 14 06/07/2016    ALT 14 06/07/2016    ALKPHOS 46 06/07/2016    CHOL 127 06/07/2016    CHOLESTEROL 141 09/18/2019    TRIG 128 09/18/2019    HDL 35 (L) 09/18/2019    LDLCALC 84 09/18/2019    NONHDLC 96 06/07/2016    LITHIUM 0 6 01/27/2020     I have personally reviewed all pertinent laboratory/tests results  Assessment/Plan:  Patient overall has been doing well  No psychiatric concerns reported  He also was not noticed to have any tremors of both hands on examination  Plan is to continue with the current medication with no changes  As mentioned patient was advised to follow with his PCP soon to get further evaluation for abnormal liver function test   The patient was advised to follow with me in 3 months or sooner if needed  He was educated about his medication again in detail including benefit, risk, side effects, alternative, contraindication, dosage and frequency  The patient agrees with the plan        Diagnoses and all orders for this visit:    Bipolar disorder, in full remission, most recent episode manic [facility])          Treatment Recommendations/Precautions:      Aware of 24 hour and weekend coverage for urgent situations accessed by calling [facility] main practice number    Risks/Benefits      Risks, Benefits And Possible Side Effects Of Medications:    Risks, benefits, and possible side effects of medications explained to Anne Moore and he verbalizes understanding and agreement for treatment  Controlled Medication Discussion:     Not applicable    Psychotherapy Provided:     Individual psychotherapy provided: Medications, treatment progress and treatment plan reviewed with Anne Moore  Medication education provided to Anne Moore  Supportive therapy provided  Crisis/safety plan discussed with Anne Moore       Treatment Plan;    Completed and signed during the session: Not applicable - Treatment Plan not due at this session    Olena Li MD 03/10/22

## 2022-06-03 ENCOUNTER — OFFICE VISIT (OUTPATIENT)
Dept: PSYCHIATRY | Facility: CLINIC | Age: 57
End: 2022-06-03
Payer: COMMERCIAL

## 2022-06-03 DIAGNOSIS — F31.74 BIPOLAR DISORDER, IN FULL REMISSION, MOST RECENT EPISODE MANIC (HCC): ICD-10-CM

## 2022-06-03 PROCEDURE — 99213 OFFICE O/P EST LOW 20 MIN: CPT | Performed by: PSYCHIATRY & NEUROLOGY

## 2022-06-03 NOTE — PSYCH
MEDICATION MANAGEMENT NOTE        [facility]      Name and Date of Birth:  Esperanza Lockhart 49 y o  1965 MRN: [id number]    Date of Visit: Maru 3, 2022    Reason for Visit:  Follow-up for medication management  Subjective: The patient reports he has been doing well  Reports mood has been stable  Denies any manic or hypomanic episode in a long time  Denies any depression  Reports occasionally might hear some whispers but not clear to him  Reports is not very often  Denies any visual hallucination  Does not endorse any paranoia or delusional ideation  Reports compliant with his medications and denies any side effect  Reports following with his primary care physician after his last visit with me and had reviewed blood work  No recommendation was provided  The patient reports compliant with the medications and denies any side effect  Denies any other medical issues  Review Of Systems:      Constitutional negative   ENT negative   Cardiovascular negative   Respiratory negative   Gastrointestinal negative   Genitourinary negative   Musculoskeletal negative   Integumentary negative   Neurological negative   Endocrine negative   Other Symptoms none       Alcohol/Substance Abuse:  Denies        Past Medical History:    No past medical history on file  No past medical history pertinent negatives  No past surgical history on file  No Known Allergies    Current Medications:       Current Outpatient Medications:     Androderm 4 MG/24HR PT24, PLACE 1 PATCH DAILY, Disp: , Rfl:     benztropine (COGENTIN) 1 mg tablet, Take 1 tablet (1 mg total) by mouth daily at bedtime, Disp: 90 tablet, Rfl: 3    lithium carbonate (LITHOBID) 450 mg CR tablet, 2 po qhs, Disp: 180 tablet, Rfl: 3    OLANZapine (ZyPREXA) 10 mg tablet, TAKE 1 TABLET BY MOUTH  DAILY AT BEDTIME, Disp: 90 tablet, Rfl: 3       History Review:  The following portions of the patient's history were reviewed and updated as appropriate: allergies, current medications, past family history, past medical history, past social history, past surgical history and problem list          OBJECTIVE:     Vital signs in last 24 hours: There were no vitals filed for this visit      Mental Status Evaluation:    Appearance age appropriate, casually dressed   Behavior cooperative, calm   Speech normal rate, normal volume, normal pitch   Mood euthymic   Affect normal range and intensity, appropriate   Thought Processes organized, goal directed   Associations intact associations   Thought Content no overt delusions   Perceptual Disturbances: no auditory hallucinations, no visual hallucinations   Abnormal Thoughts  Risk Potential Suicidal ideation - None  Homicidal ideation - None  Potential for aggression - No   Orientation oriented to person, place, time/date and situation   Memory recent and remote memory grossly intact   Consciousness alert and awake   Attention Span Concentration Span attention span and concentration are age appropriate   Intellect appears to be of average intelligence   Insight intact   Judgement intact   Muscle Strength and  Gait normal gait and normal balance   Motor activity Mild tremor of the left hand   Language no difficulty naming common objects, no difficulty repeating a phrase   Fund of Knowledge adequate knowledge of current events  adequate fund of knowledge regarding past history  adequate fund of knowledge regarding vocabulary    Pain none   Pain Scale 0       Laboratory Results:   Most Recent Labs:   Lab Results   Component Value Date     06/12/2017    K 4 2 01/27/2020     01/27/2020    CO2 29 01/27/2020    BUN 11 01/27/2020    CREATININE 1 00 01/27/2020    CALCIUM 9 5 01/27/2020    AST 14 06/07/2016    ALT 14 06/07/2016    ALKPHOS 46 06/07/2016    PROT 6 8 06/07/2016    BILITOT 0 6 06/07/2016    CHOL 127 06/07/2016    CHOLESTEROL 141 09/18/2019    TRIG 128 09/18/2019    HDL 35 (L) 09/18/2019    LDLCALC 84 09/18/2019    NONHDLC 96 06/07/2016    LITHIUM 0 6 01/27/2020     I have personally reviewed all pertinent laboratory/tests results  Assessment/Plan:  Patient overall doing well  No concerns reported  Plan is to continue with the current medication at this time with no changes  The patient will follow-up with me in 3 months or sooner if needed  At next visit will give the patient lab slip to get lithium level checked again  Patient last checked was in February this year  He also again educated about his medication in detail including benefit, risk, side effects, alternative, contraindication, dosage and frequency  The patient was advised to call me if there is any concern and to call crisis or visit nearby ER in case of any emergency  The patient agrees with the plan      Diagnoses and all orders for this visit:    Bipolar disorder, in full remission, most recent episode manic ([facility] [address] )          Treatment Recommendations/Precautions:      Aware of 24 hour and weekend coverage for urgent situations accessed by calling [facility] main practice number    Risks/Benefits      Risks, Benefits And Possible Side Effects Of Medications:    Risks, benefits, and possible side effects of medications explained to Ashish Vaughn and he verbalizes understanding and agreement for treatment  Controlled Medication Discussion:     Not applicable    Psychotherapy Provided:     Individual psychotherapy provided: Medications, treatment progress and treatment plan reviewed with Ashish Vaughn  Medication education provided to Ashish Vaughn  Reassurance and supportive therapy provided  Crisis/safety plan discussed with Ashish Vaughn       Treatment Plan;    Completed and signed during the session: Not applicable - Treatment Plan not due at this session    Irma Cuba MD 06/03/22

## 2022-07-06 DIAGNOSIS — F31.74 BIPOLAR DISORDER, IN FULL REMISSION, MOST RECENT EPISODE MANIC (HCC): ICD-10-CM

## 2022-07-06 RX ORDER — BENZTROPINE MESYLATE 1 MG/1
TABLET ORAL
Qty: 90 TABLET | Refills: 3 | Status: SHIPPED | OUTPATIENT
Start: 2022-07-06

## 2022-07-06 RX ORDER — LITHIUM CARBONATE 450 MG
TABLET, EXTENDED RELEASE ORAL
Qty: 180 TABLET | Refills: 3 | Status: SHIPPED | OUTPATIENT
Start: 2022-07-06

## 2022-08-15 DIAGNOSIS — F31.74 BIPOLAR DISORDER, IN FULL REMISSION, MOST RECENT EPISODE MANIC (HCC): ICD-10-CM

## 2022-08-15 RX ORDER — OLANZAPINE 10 MG/1
TABLET ORAL
Qty: 90 TABLET | Refills: 3 | Status: SHIPPED | OUTPATIENT
Start: 2022-08-15

## 2022-09-08 ENCOUNTER — OFFICE VISIT (OUTPATIENT)
Dept: PSYCHIATRY | Facility: CLINIC | Age: 57
End: 2022-09-08
Payer: COMMERCIAL

## 2022-09-08 DIAGNOSIS — F31.74 BIPOLAR DISORDER, IN FULL REMISSION, MOST RECENT EPISODE MANIC (HCC): Primary | ICD-10-CM

## 2022-09-08 PROCEDURE — 99213 OFFICE O/P EST LOW 20 MIN: CPT | Performed by: PSYCHIATRY & NEUROLOGY

## 2022-09-08 NOTE — BH TREATMENT PLAN
TREATMENT PLAN (Medication Management Only)        [facility]    Name and Date of Birth:  Matt English 49 y o  1965  Date of Treatment Plan: September 8, 2022  Diagnosis/Diagnoses:    1  Bipolar disorder, in full remission, most recent episode manic [facility])      Strengths/Personal Resources for Self-Care: supportive family, taking medications as prescribed, ability to communicate well, ability to understand psychiatric illness  Area/Areas of need (in own words): anxiety, mood swings  1  Long Term Goal: maintain improvement in mood  Target Date:6 months - 3/8/2023  Person/Persons responsible for completion of goal: Betsy Correa  2  Short Term Objective (s) - How will we reach this goal?:   A  Provider new recommended medication/dosage changes and/or continue medication(s): continue current medications as prescribed  B  N/A   C  N/A  Target Date:6 months - 3/8/2023  Person/Persons Responsible for Completion of Goal: Betsy Correa  Progress Towards Goals: continuing treatment  Treatment Modality: medication management every 6 months  Review due 180 days from date of this plan: 6 months - 3/8/2023  Expected length of service: ongoing treatment  My Physician/PA/NP and I have developed this plan together and I agree to work on the goals and objectives  I understand the treatment goals that were developed for my treatment

## 2022-11-21 ENCOUNTER — TELEPHONE (OUTPATIENT)
Dept: PSYCHIATRY | Facility: CLINIC | Age: 57
End: 2022-11-21

## 2022-11-21 DIAGNOSIS — F31.74 BIPOLAR DISORDER, IN FULL REMISSION, MOST RECENT EPISODE MANIC (HCC): ICD-10-CM

## 2022-11-21 RX ORDER — LITHIUM CARBONATE 450 MG
TABLET, EXTENDED RELEASE ORAL
Qty: 180 TABLET | Refills: 0 | Status: SHIPPED | OUTPATIENT
Start: 2022-11-21

## 2022-11-21 NOTE — TELEPHONE ENCOUNTER
[facility] is out of stock for lithium        Ranulfo Sep called to request a 90 day supply to [facility] in [place]

## 2022-12-08 ENCOUNTER — OFFICE VISIT (OUTPATIENT)
Dept: PSYCHIATRY | Facility: CLINIC | Age: 57
End: 2022-12-08

## 2022-12-08 DIAGNOSIS — F31.74 BIPOLAR DISORDER, IN FULL REMISSION, MOST RECENT EPISODE MANIC (HCC): Primary | ICD-10-CM

## 2022-12-08 NOTE — PSYCH
MEDICATION MANAGEMENT NOTE        [place] [address] [facility]      Name and Date of Birth:  Vu Glass 56 y o  1965 MRN: [id number]    Date of Visit: December 8, 2022    Reason for Visit:  Follow-up for medication management    Subjective: The patient reports he has been overall doing very well  Reports mood has been stable  Denies any manic or depressive episode in long time  Reports sleep, appetite, energy level and focus has been good  Denies any SI or HI  Denies any anxiety or panic attack  The patient was examined for tremors of hands and he had very minimal tremor of his left hand  No change in the tremor from last visit  Patient reports when he writes he can have some difficulty due to tremor  Reports compliant with the medications and denies any side effect  Patient blood work which includes CMP and serum lithium level was within normal range except for blood sugar which was 103  Lithium level was 0 8 the last month  The patient denies any concern today  Lives with his wife and daughter and reports has been very good support  The patient currently does not work  Review Of Systems:      Constitutional negative   ENT negative   Cardiovascular negative   Respiratory negative   Gastrointestinal negative   Genitourinary negative   Musculoskeletal negative   Integumentary negative   Neurological negative   Endocrine negative   Other Symptoms none       Alcohol/Substance Abuse:  Denies        Past Medical History:    No past medical history on file  No past surgical history on file    No Known Allergies    Current Medications:       Current Outpatient Medications:   •  Androderm 4 MG/24HR PT24, PLACE 1 PATCH DAILY, Disp: , Rfl:   •  benztropine (COGENTIN) 1 mg tablet, TAKE 1 TABLET BY MOUTH  DAILY AT BEDTIME, Disp: 90 tablet, Rfl: 3  •  lithium carbonate (LITHOBID) 450 mg CR tablet, TAKE 2 TABLETS BY MOUTH  EVERY NIGHT AT BEDTIME, Disp: 180 tablet, Rfl: 0  •  OLANZapine (ZyPREXA) 10 mg tablet, TAKE 1 TABLET BY MOUTH  DAILY AT BEDTIME, Disp: 90 tablet, Rfl: 3       History Review: The following portions of the patient's history were reviewed and updated as appropriate: allergies, current medications, past family history, past medical history, past social history, past surgical history and problem list          OBJECTIVE:     Vital signs in last 24 hours: There were no vitals filed for this visit      Mental Status Evaluation:    Appearance age appropriate, casually dressed   Behavior cooperative, calm   Speech normal rate, normal volume, normal pitch   Mood euthymic   Affect normal range and intensity, appropriate   Thought Processes organized, goal directed   Associations intact associations   Thought Content no overt delusions   Perceptual Disturbances: no auditory hallucinations, no visual hallucinations   Abnormal Thoughts  Risk Potential Suicidal ideation - None  Homicidal ideation - None  Potential for aggression - No   Orientation oriented to person, place, time/date and situation   Memory recent and remote memory grossly intact   Consciousness alert and awake   Attention Span Concentration Span attention span and concentration are age appropriate   Intellect appears to be of average intelligence   Insight intact   Judgement intact   Muscle Strength and  Gait normal gait and normal balance   Motor activity no abnormal movements   Language no difficulty naming common objects, no difficulty repeating a phrase   Fund of Knowledge adequate knowledge of current events  adequate fund of knowledge regarding past history  adequate fund of knowledge regarding vocabulary    Pain none   Pain Scale 0       Laboratory Results:   Most Recent Labs:   Lab Results   Component Value Date     06/12/2017    K 4 2 01/27/2020     01/27/2020    CO2 29 01/27/2020    BUN 11 01/27/2020    CREATININE 1 00 01/27/2020    CALCIUM 9 5 01/27/2020    AST 14 06/07/2016    ALT 14 06/07/2016    ALKPHOS 46 06/07/2016    PROT 6 8 06/07/2016    BILITOT 0 6 06/07/2016    CHOL 127 06/07/2016    CHOLESTEROL 141 09/18/2019    TRIG 128 09/18/2019    HDL 35 (L) 09/18/2019    LDLCALC 84 09/18/2019    NONHDLC 96 06/07/2016    LITHIUM 0 6 01/27/2020     I have personally reviewed all pertinent laboratory/tests results  Assessment/Plan:  Patient doing overall very well  Plan is to continue with the current medication with no changes  The patient will follow with me in 3 months or sooner if needed  He was educated about his medication also in detail including benefit, risk, side effects and alternatives  He was advised to call us if there is any concern and to call crisis or visit nearby ER in case of any emergency  The patient verbalized understanding agrees with the current plan  Diagnoses and all orders for this visit:    Bipolar disorder, in full remission, most recent episode manic ([facility] [address] )          Treatment Recommendations/Precautions:      Aware of 24 hour and weekend coverage for urgent situations accessed by calling [facility] main practice number    Risks/Benefits      Risks, Benefits And Possible Side Effects Of Medications:    Risks, benefits, and possible side effects of medications explained to Anaya Clarke and he verbalizes understanding and agreement for treatment  Controlled Medication Discussion:     Not applicable    Psychotherapy Provided:     Individual psychotherapy provided: Medications, treatment progress and treatment plan reviewed with Anaya Clarke  Medication education provided to Anaya Clarke  Supportive therapy provided  Crisis/safety plan discussed with Anaya Clarke       Treatment Plan;    Completed and signed during the session: Not due  at this time    Sarika Santana MD 12/08/22

## 2023-01-23 DIAGNOSIS — F31.74 BIPOLAR DISORDER, IN FULL REMISSION, MOST RECENT EPISODE MANIC (HCC): ICD-10-CM

## 2023-01-23 RX ORDER — LITHIUM CARBONATE 450 MG
TABLET, EXTENDED RELEASE ORAL
Qty: 180 TABLET | Refills: 1 | Status: SHIPPED | OUTPATIENT
Start: 2023-01-23

## 2023-01-23 RX ORDER — BENZTROPINE MESYLATE 1 MG/1
1 TABLET ORAL
Qty: 90 TABLET | Refills: 3 | Status: SHIPPED | OUTPATIENT
Start: 2023-01-23

## 2023-01-23 RX ORDER — OLANZAPINE 10 MG/1
10 TABLET ORAL
Qty: 90 TABLET | Refills: 3 | Status: SHIPPED | OUTPATIENT
Start: 2023-01-23

## 2023-01-23 NOTE — TELEPHONE ENCOUNTER
Patient requesting all scripts be sent at the same time as they would prefer that everything be due at the same time for refill and not run out at different times  Patient is requesting 90 day supply with 3 refills as well

## 2023-03-07 ENCOUNTER — OFFICE VISIT (OUTPATIENT)
Dept: PSYCHIATRY | Facility: CLINIC | Age: 58
End: 2023-03-07

## 2023-03-07 DIAGNOSIS — F31.74 BIPOLAR DISORDER, IN FULL REMISSION, MOST RECENT EPISODE MANIC (HCC): Primary | ICD-10-CM

## 2023-03-07 NOTE — PSYCH
MEDICATION MANAGEMENT NOTE        [place] [address] [employer] [facility]      Name and Date of Birth:  Derek Ritchie 56 y o  1965 MRN: [id number]    Date of Visit: March 7, 2023    Reason for Visit: Follow-up for medication management    Subjective: Patient reports she overall has been doing well  Reports mood has been stable and denies any manic or depressive episodes since he last saw me  Reports sleep and appetite has been good  Denies any hallucinations or endorsing any paranoia or delusional ideation  Reports compliant with medication  On examination noted to have mild tremors more prominent on the left hand  Patient denies any other abnormal involuntary movement  On examination also no abnormal involuntary movement was noted  Patient reports compliant with meds and denies any side effect  Patient reports in addition to his current meds taking melatonin 10 mg at bedtime  Advised to take only if needed for poor sleep  Denies any other medical issues lately  Review Of Systems:      Constitutional negative   ENT negative   Cardiovascular negative   Respiratory negative   Gastrointestinal negative   Genitourinary negative   Musculoskeletal negative   Integumentary negative   Neurological negative   Endocrine negative   Other Symptoms none       Alcohol/Substance Abuse: denies        Past Medical History:    History reviewed  No pertinent past medical history  History reviewed  No pertinent surgical history    No Known Allergies    Current Medications:       Current Outpatient Medications:   •  Androderm 4 MG/24HR PT24, PLACE 1 PATCH DAILY, Disp: , Rfl:   •  benztropine (COGENTIN) 1 mg tablet, Take 1 tablet (1 mg total) by mouth daily at bedtime, Disp: 90 tablet, Rfl: 3  •  lithium carbonate (LITHOBID) 450 mg CR tablet, TAKE 2 TABLETS BY MOUTH  EVERY NIGHT AT BEDTIME, Disp: 180 tablet, Rfl: 1  •  OLANZapine (ZyPREXA) 10 mg tablet, Take 1 tablet (10 mg total) by mouth daily at bedtime, Disp: 90 tablet, Rfl: 3       History Review: The following portions of the patient's history were reviewed and updated as appropriate: allergies, current medications, past family history, past medical history, past social history, past surgical history and problem list          OBJECTIVE:     Vital signs in last 24 hours: There were no vitals filed for this visit      Mental Status Evaluation:    Appearance age appropriate, casually dressed   Behavior cooperative, calm   Speech normal rate, normal volume, normal pitch   Mood euthymic   Affect normal range and intensity, appropriate   Thought Processes organized, goal directed   Associations intact associations   Thought Content no overt delusions   Perceptual Disturbances: no auditory hallucinations, no visual hallucinations   Abnormal Thoughts  Risk Potential Suicidal ideation - None  Homicidal ideation - None  Potential for aggression - No   Orientation oriented to person, place, time/date and situation   Memory recent and remote memory grossly intact   Consciousness alert and awake   Attention Span Concentration Span attention span and concentration are age appropriate   Intellect appears to be of average intelligence   Insight intact   Judgement intact   Muscle Strength and  Gait normal gait and normal balance   Motor activity abnormal movement noted: mild hand tremor present   Language no difficulty naming common objects, no difficulty repeating a phrase   Fund of Knowledge adequate knowledge of current events  adequate fund of knowledge regarding past history  adequate fund of knowledge regarding vocabulary    Pain none   Pain Scale 0       Laboratory Results:   Most Recent Labs:   Lab Results   Component Value Date     06/12/2017    SODIUM 142 01/27/2020    K 4 2 01/27/2020     01/27/2020    CO2 29 01/27/2020    BUN 11 01/27/2020    CREATININE 1 00 01/27/2020    CALCIUM 9 5 01/27/2020    AST 14 06/07/2016    ALT 14 06/07/2016    ALKPHOS 46 06/07/2016    PROT 6 8 06/07/2016    BILITOT 0 6 06/07/2016    CHOL 127 06/07/2016    CHOLESTEROL 141 09/18/2019    TRIG 128 09/18/2019    HDL 35 (L) 09/18/2019    LDLCALC 84 09/18/2019    NONHDLC 96 06/07/2016    LITHIUM 0 6 01/27/2020     I have personally reviewed all pertinent laboratory/tests results  Assessment/Plan: Patient overall doing well and no concerns reported  The plan is to continue with the current medication with no changes  The patient feels current meds has been very helpful  Patient will follow up with me in 3 months or sooner if needed  The patient requested for 3 months appointment  He was educated about his medication in detail and advised to call us if there is any concern or to call crisis or visit nearby ER in case of any emergency  Patient verbalized understanding and agrees with the plan  Diagnoses and all orders for this visit:    Bipolar disorder, in full remission, most recent episode manic ([facility] [address] )          Treatment Recommendations/Precautions:      Aware of 24 hour and weekend coverage for urgent situations accessed by calling [facility] main practice number    Risks/Benefits      Risks, Benefits And Possible Side Effects Of Medications:    Risks, benefits, and possible side effects of medications explained to Pablo Brown and he verbalizes understanding and agreement for treatment  Controlled Medication Discussion:     Not applicable    Psychotherapy Provided:     Individual psychotherapy provided: Medications, treatment progress and treatment plan reviewed with Pablo Brown  Medication education provided to Pablo Brown  Reassurance and supportive therapy provided  Crisis/safety plan discussed with Palbo Brown       Treatment Plan;    Completed and signed during the session: Yes - with Wanda Travis MD 03/07/23

## 2023-03-07 NOTE — BH TREATMENT PLAN
TREATMENT PLAN (Medication Management Only)        [facility]    Name and Date of Birth:  Catracho Estes 56 y o  1965  Date of Treatment Plan: March 7, 2023  Diagnosis/Diagnoses:    1  Bipolar disorder, in full remission, most recent episode manic [facility])      Strengths/Personal Resources for Self-Care: supportive family, taking medications as prescribed, ability to adapt to life changes, ability to listen, ability to reason  Area/Areas of need (in own words): maintain bipolar disorder in remission, Psychosis  1  Long Term Goal: stable mood, psychosis in remission     Target Date:6 months - 9/7/2023  Person/Persons responsible for completion of goal: Media Crank  2  Short Term Objective (s) - How will we reach this goal?:   A  Provider new recommended medication/dosage changes and/or continue medication(s): continue current medications as prescribed  B  N/A   C  N/A  Target Date:6 months - 9/7/2023  Person/Persons Responsible for Completion of Goal: Media Crank  Progress Towards Goals: continuing treatment  Treatment Modality: medication management every 3 months  Review due 180 days from date of this plan: 6 months - 9/7/2023  Expected length of service: ongoing treatment  My Physician/PA/NP and I have developed this plan together and I agree to work on the goals and objectives  I understand the treatment goals that were developed for my treatment

## 2023-06-02 ENCOUNTER — TELEPHONE (OUTPATIENT)
Dept: PSYCHIATRY | Facility: CLINIC | Age: 58
End: 2023-06-02

## 2023-06-02 NOTE — TELEPHONE ENCOUNTER
Patient called stating he needs a physical prescription for lab work to get his lithium level checked as he does not have any [facility] labs where he is residing and will have to go to another lab for his test, if it could possibly be mailed that would be helpful as well

## 2023-06-05 DIAGNOSIS — F31.74 BIPOLAR DISORDER, IN FULL REMISSION, MOST RECENT EPISODE MANIC (HCC): Primary | ICD-10-CM

## 2023-06-05 DIAGNOSIS — F31.74 BIPOLAR DISORDER, IN FULL REMISSION, MOST RECENT EPISODE MANIC (HCC): ICD-10-CM

## 2023-06-05 RX ORDER — OLANZAPINE 10 MG/1
10 TABLET ORAL
Qty: 90 TABLET | Refills: 3 | Status: SHIPPED | OUTPATIENT
Start: 2023-06-05

## 2023-06-05 RX ORDER — BENZTROPINE MESYLATE 1 MG/1
1 TABLET ORAL
Qty: 90 TABLET | Refills: 3 | Status: SHIPPED | OUTPATIENT
Start: 2023-06-05

## 2023-06-05 RX ORDER — LITHIUM CARBONATE 450 MG
900 TABLET, EXTENDED RELEASE ORAL
Qty: 180 TABLET | Refills: 3 | Status: SHIPPED | OUTPATIENT
Start: 2023-06-05 | End: 2024-06-04

## 2023-06-05 NOTE — TELEPHONE ENCOUNTER
Patient called for refill on Zyprexa 10mg and Cogentin 1mg  He stated he attempted to contact [facility] office and the phone circled him back to the main line  Patient would prefer if all three of his medication were prescribed at the same time so he does not have to call them in at different times  For your review, thank you

## 2023-06-08 ENCOUNTER — OFFICE VISIT (OUTPATIENT)
Dept: PSYCHIATRY | Facility: CLINIC | Age: 58
End: 2023-06-08
Payer: COMMERCIAL

## 2023-06-08 DIAGNOSIS — F31.74 BIPOLAR DISORDER, IN FULL REMISSION, MOST RECENT EPISODE MANIC (HCC): Primary | ICD-10-CM

## 2023-06-08 PROCEDURE — 99213 OFFICE O/P EST LOW 20 MIN: CPT | Performed by: PSYCHIATRY & NEUROLOGY

## 2023-06-08 NOTE — PSYCH
MEDICATION MANAGEMENT NOTE        [place] [address] [facility]      Name and Date of Birth:  Caity Liu 56 y o  1965 MRN: [id number]    Date of Visit: June 8, 2023    Reason for Visit: Follow-up for medication management  Subjective: The patient reports he has been overall doing well since he last saw me  Denies any depressive or manic episode  Reports sleep, appetite, energy level has been fine  Reports takes melatonin 10 mg at night and it helps with the sleep  Reports if he misses taking it his sleep is not very sound  The patient denies any psychotic symptoms  Denies any hallucination  Does not endorse any paranoia or delusional ideation  Reports anxiety is also well controlled  Denies any concern today  Reports compliant with the medication and denies any side effects  On examination noted to have mild tremor of his left hand  Denies any abnormal involuntary movement  on examination not noted to have any abnormal involuntary movement of any part of the body  The patient was given a lab slip to get the blood work done but the patient reports he  does not live nearby [facility] and wants a lab slip  He was given the slip today  Denies any other medical history  Reports he takes aspirin low-dose 81 mg daily as prescribed by other prescribers prophylactically  No other concerns  Review Of Systems:      Constitutional negative   ENT negative   Cardiovascular negative   Respiratory negative   Gastrointestinal negative   Genitourinary negative   Musculoskeletal negative   Integumentary negative   Neurological negative   Endocrine negative   Other Symptoms none       Alcohol/Substance Abuse: Denies        Past Medical History:    History reviewed  No pertinent past medical history  History reviewed  No pertinent surgical history    No Known Allergies    Current Medications:       Current Outpatient Medications:   •  aspirin (ECOTRIN LOW STRENGTH) 81 mg EC tablet, Take 81 mg by mouth daily, Disp: , Rfl:   •  benztropine (COGENTIN) 1 mg tablet, Take 1 tablet (1 mg total) by mouth daily at bedtime, Disp: 90 tablet, Rfl: 3  •  lithium carbonate (LITHOBID) 450 mg CR tablet, Take 2 tablets (900 mg total) by mouth daily at bedtime, Disp: 180 tablet, Rfl: 3  •  OLANZapine (ZyPREXA) 10 mg tablet, Take 1 tablet (10 mg total) by mouth daily at bedtime, Disp: 90 tablet, Rfl: 3  •  Androderm 4 MG/24HR PT24, PLACE 1 PATCH DAILY (Patient not taking: Reported on 6/8/2023), Disp: , Rfl:   •  Multiple Vitamin (MULTI-VITAMIN) tablet, Take by mouth, Disp: , Rfl:        History Review: The following portions of the patient's history were reviewed and updated as appropriate: allergies, current medications, past family history, past medical history, past social history, past surgical history and problem list          OBJECTIVE:     Vital signs in last 24 hours: There were no vitals filed for this visit  Mental Status Evaluation:    Appearance age appropriate, casually dressed   Behavior cooperative, calm   Speech normal rate, normal volume, normal pitch   Mood normal   Affect normal range and intensity, appropriate   Thought Processes organized, goal directed   Associations intact associations   Thought Content no overt delusions   Perceptual Disturbances: no auditory hallucinations, no visual hallucinations   Abnormal Thoughts  Risk Potential Suicidal ideation - None  Homicidal ideation - None  Potential for aggression - No   Orientation oriented to person, place, time/date and situation   Memory recent and remote memory grossly intact   Consciousness alert and awake   Attention Span Concentration Span attention span and concentration are age appropriate   Intellect appears to be of average intelligence   Insight intact   Judgement intact   Muscle Strength and  Gait normal gait and normal balance   Motor activity Minimal to mild tremor of left hand     Language no difficulty naming common objects, no difficulty repeating a phrase   Fund of Knowledge adequate knowledge of current events  adequate fund of knowledge regarding past history  adequate fund of knowledge regarding vocabulary    Pain none   Pain Scale 0       Laboratory Results:   Most Recent Labs:   Lab Results   Component Value Date    ALKPHOS 46 06/07/2016    ALT 14 06/07/2016    AST 14 06/07/2016    BILITOT 0 6 06/07/2016    BUN 11 01/27/2020    CALCIUM 9 5 01/27/2020    CHOL 127 06/07/2016    CHOLESTEROL 141 09/18/2019     01/27/2020    CO2 29 01/27/2020    CREATININE 1 00 01/27/2020    HDL 35 (L) 09/18/2019    K 4 2 01/27/2020    LDLCALC 84 09/18/2019    LITHIUM 0 6 01/27/2020     06/12/2017    NONHDLC 96 06/07/2016    PROT 6 8 06/07/2016    SODIUM 142 01/27/2020    TRIG 128 09/18/2019     I have personally reviewed all pertinent laboratory/tests results  Assessment/Plan: The patient overall doing well and has been stable  No concerns reported  Plan is to continue the current medication with no changes  Blood work was ordered as mentioned below and patient advised to have it done within the next few weeks  The lipid panel could not be ordered today due to insurance issues  The patient will talk to the primary care physician for the lab order for lipid panel  The patient educated about his medication again in detail and advised to call me if there is any concern or to call crisis or visit nearby ER in case of any emergency or having any SI or HI  Patient verbalized understanding and agrees with the plan  The patient was advised to follow with me in 6 months or sooner if needed but the patient prefer to see me every 3 months  Diagnoses and all orders for this visit:    Bipolar disorder, in full remission, most recent episode manic ([facility] [address] )  -     CBC and differential; Future  -     Comprehensive metabolic panel; Future  -     Lithium level;  Future  -     TSH + Free T4; Future    Other orders  -     Multiple Vitamin (MULTI-VITAMIN) tablet; Take by mouth  -     aspirin (ECOTRIN LOW STRENGTH) 81 mg EC tablet; Take 81 mg by mouth daily          Treatment Recommendations/Precautions:      Aware of 24 hour and weekend coverage for urgent situations accessed by calling [facility] main practice number    Risks/Benefits      Risks, Benefits And Possible Side Effects Of Medications:    Risks, benefits, and possible side effects of medications explained to Nj Joshi and he verbalizes understanding and agreement for treatment  Controlled Medication Discussion:     Not applicable    Psychotherapy Provided:     Individual psychotherapy provided: Medications, treatment progress and treatment plan reviewed with Nj Joshi  Medication education provided to Nj Joshi  Reassurance and supportive therapy provided  Crisis/safety plan discussed with Nj Joshi       Treatment Plan;    Completed and signed during the session: Not applicable - Treatment Plan not due at this session    Warren Joseph MD 06/08/23

## 2023-09-12 ENCOUNTER — OFFICE VISIT (OUTPATIENT)
Dept: PSYCHIATRY | Facility: CLINIC | Age: 58
End: 2023-09-12
Payer: COMMERCIAL

## 2023-09-12 DIAGNOSIS — R74.8 ELEVATED LIVER ENZYMES: Primary | ICD-10-CM

## 2023-09-12 DIAGNOSIS — F31.74 BIPOLAR DISORDER, IN FULL REMISSION, MOST RECENT EPISODE MANIC (HCC): ICD-10-CM

## 2023-09-12 PROCEDURE — 99214 OFFICE O/P EST MOD 30 MIN: CPT | Performed by: PSYCHIATRY & NEUROLOGY

## 2023-09-12 NOTE — PSYCH
MEDICATION MANAGEMENT NOTE        [facility] [address] [facility]      Name and Date of Birth:  Cristiane Weeks 56 y.o. 1965 MRN: [id number]    Date of Visit: September 12, 2023    Reason for Visit: Follow-up for medication management    Subjective: The patient reports he has been doing very well since he last saw me 3 months back. Denies having any depressive or manic episode. Reports sleep, appetite, energy level has been fine. Denies feeling anxious. Denies any hopelessness or suicidal thoughts. Reports compliant with the medication and denies any side effect. Does not report any hallucinations or paranoia. Reports overall doing very well. Patient did the blood work and his CBC was within normal range. CMP was within normal range except for AST and ALT which were elevated. His lithium was 0.9. The patient denies any history of medical issues related with liver in the past.  The patient agrees for further blood workup to assess the liver function. The patient also encouraged to follow with his primary care physician for further workup. The patient denies any other concern. Review Of Systems:      Constitutional negative   ENT negative   Cardiovascular negative   Respiratory negative   Gastrointestinal negative   Genitourinary negative   Musculoskeletal negative   Integumentary negative   Neurological negative   Endocrine negative   Other Symptoms none       Alcohol/Substance Abuse: Denies        Past Medical History:    History reviewed. No pertinent past medical history. History reviewed. No pertinent surgical history.   No Known Allergies    Current Medications:       Current Outpatient Medications:   •  Androderm 4 MG/24HR PT24, PLACE 1 PATCH DAILY (Patient not taking: Reported on 6/8/2023), Disp: , Rfl:   •  aspirin (ECOTRIN LOW STRENGTH) 81 mg EC tablet, Take 81 mg by mouth daily, Disp: , Rfl:   •  benztropine (COGENTIN) 1 mg tablet, Take 1 tablet (1 mg total) by mouth daily at bedtime, Disp: 90 tablet, Rfl: 3  •  lithium carbonate (LITHOBID) 450 mg CR tablet, Take 2 tablets (900 mg total) by mouth daily at bedtime, Disp: 180 tablet, Rfl: 3  •  Multiple Vitamin (MULTI-VITAMIN) tablet, Take by mouth, Disp: , Rfl:   •  OLANZapine (ZyPREXA) 10 mg tablet, Take 1 tablet (10 mg total) by mouth daily at bedtime, Disp: 90 tablet, Rfl: 3       History Review: The following portions of the patient's history were reviewed and updated as appropriate: allergies, current medications, past family history, past medical history, past social history, past surgical history and problem list.         OBJECTIVE:     Vital signs in last 24 hours: There were no vitals filed for this visit.     Mental Status Evaluation:    Appearance age appropriate, casually dressed   Behavior cooperative, calm   Speech normal rate, normal volume, normal pitch   Mood normal   Affect normal range and intensity, appropriate   Thought Processes organized, goal directed   Associations intact associations   Thought Content no overt delusions   Perceptual Disturbances: no auditory hallucinations, no visual hallucinations   Abnormal Thoughts  Risk Potential Suicidal ideation - None  Homicidal ideation - None  Potential for aggression - No   Orientation oriented to person, place, time/date and situation   Memory recent and remote memory grossly intact   Consciousness alert and awake   Attention Span Concentration Span attention span and concentration are age appropriate   Intellect appears to be of average intelligence   Insight intact   Judgement intact   Muscle Strength and  Gait normal gait and normal balance   Motor activity no abnormal movements   Language no difficulty naming common objects, no difficulty repeating a phrase   Fund of Knowledge adequate knowledge of current events  adequate fund of knowledge regarding past history  adequate fund of knowledge regarding vocabulary    Pain none   Pain Scale 0       Laboratory Results:   Most Recent Labs:   Lab Results   Component Value Date     06/12/2017    SODIUM 142 01/27/2020    K 4.2 01/27/2020     01/27/2020    CO2 29 01/27/2020    BUN 11 01/27/2020    CREATININE 1.00 01/27/2020    CALCIUM 9.5 01/27/2020    AST 14 06/07/2016    ALT 14 06/07/2016    ALKPHOS 46 06/07/2016    PROT 6.8 06/07/2016    BILITOT 0.6 06/07/2016    CHOL 127 06/07/2016    CHOLESTEROL 141 09/18/2019    TRIG 128 09/18/2019    HDL 35 (L) 09/18/2019    LDLCALC 84 09/18/2019    NONHDLC 96 06/07/2016    LITHIUM 0.6 01/27/2020     I have personally reviewed all pertinent laboratory/tests results. Assessment/Plan: Patient overall doing well. The blood work showed elevated liver enzymes. Will order hepatic function panel to further assess. The patient also encouraged to follow up with primary care physician. If the patient liver function continues to be elevated might consider decreasing the olanzapine dose. The patient medication at this time will be continued with no change. He was again educated about his meds in detail and advised to call if there is any concern and to call crisis or visit nearby ER in case of any emergency. Patient verbalized understanding agrees with the plan. Diagnoses and all orders for this visit:    Elevated liver enzymes  -     Hepatic function panel; Future    Bipolar disorder, in full remission, most recent episode manic ([address])          Treatment Recommendations/Precautions:      Aware of 24 hour and weekend coverage for urgent situations accessed by calling [facility] main practice number    Risks/Benefits      Risks, Benefits And Possible Side Effects Of Medications:    Risks, benefits, and possible side effects of medications explained to Deepika Bartlett and he verbalizes understanding and agreement for treatment.     Controlled Medication Discussion:     Not applicable    Psychotherapy Provided:     Individual psychotherapy provided: Medications, treatment progress and treatment plan reviewed with Diandra Crandall. Medication education provided to Diandra Crandall. Reassurance and supportive therapy provided. Crisis/safety plan discussed with Diandra Crandall.      Treatment Plan;    Completed and signed during the session: Yes - with Maged Brandon MD 09/12/23

## 2023-09-12 NOTE — BH TREATMENT PLAN
TREATMENT PLAN (Medication Management Only)        [address] [facility]    Name and Date of Birth:  Bailey Basilio 56 y.o. 1965  Date of Treatment Plan: September 12, 2023  Diagnosis/Diagnoses:    1. Elevated liver enzymes    2. Bipolar disorder, in full remission, most recent episode manic [facility])      Strengths/Personal Resources for Self-Care: supportive family, taking medications as prescribed, ability to communicate well, ability to listen, ability to reason. Area/Areas of need (in own words): anxiety, anxiety symptoms, mood instability  1. Long Term Goal: Feel better about self. Target Date:6 months - 3/12/2024  Person/Persons responsible for completion of goal: Kendra Dominguez  2. Short Term Objective (s) - How will we reach this goal?:   A. Provider new recommended medication/dosage changes and/or continue medication(s): continue current medications as prescribed. B. N/A.  C. N/A. Target Date:6 months - 3/12/2024  Person/Persons Responsible for Completion of Goal: Kendra Dominguez  Progress Towards Goals: continuing treatment  Treatment Modality: medication management every 3 months  Review due 180 days from date of this plan: 6 months - 3/12/2024  Expected length of service: ongoing treatment  My Physician/PA/NP and I have developed this plan together and I agree to work on the goals and objectives. I understand the treatment goals that were developed for my treatment.

## 2023-12-12 ENCOUNTER — OFFICE VISIT (OUTPATIENT)
Dept: PSYCHIATRY | Facility: CLINIC | Age: 58
End: 2023-12-12
Payer: COMMERCIAL

## 2023-12-12 DIAGNOSIS — F31.74 BIPOLAR DISORDER, IN FULL REMISSION, MOST RECENT EPISODE MANIC (HCC): Primary | ICD-10-CM

## 2023-12-12 PROCEDURE — 99214 OFFICE O/P EST MOD 30 MIN: CPT | Performed by: PSYCHIATRY & NEUROLOGY

## 2023-12-12 NOTE — PSYCH
MEDICATION MANAGEMENT NOTE        [facility] [address] [facility]      Name and Date of Birth:  Freddy Tomas 66 y.o. 1965 MRN: [id number]    Date of Visit: December 12, 2023    Reason for Visit: Follow-up for medication management. Subjective: The patient reports he has been doing well since he last saw me. Denies any manic or depressive episode or a long time. Reports sleep, appetite, energy level has been good. Denies any SI or HI. Reports anxiety well controlled. Denies any auditory or visual hallucination. Does not endorse any paranoia or delusional ideation. Reports compliant with medication and denies any side effect. On examination mild tremor of both the hands noted more prominent left hand. Denies any other abnormal involuntary movement or was noticeable on examination. The patient had repeated liver function tests since last visit and his ALT and AST significantly came down. His ALT is still slightly higher at 56 but AST was within normal range. Review Of Systems:      Constitutional negative   ENT negative   Cardiovascular negative   Respiratory negative   Gastrointestinal negative   Genitourinary negative   Musculoskeletal negative   Integumentary negative   Neurological negative   Endocrine negative   Other Symptoms none       Alcohol/Substance Abuse: Denies        Past Medical History:    History reviewed. No pertinent past medical history. History reviewed. No pertinent surgical history. No Known Allergies    Current Medications:       Current Outpatient Medications:      Androderm 4 MG/24HR PT24, PLACE 1 PATCH DAILY (Patient not taking: Reported on 6/8/2023), Disp: , Rfl:     aspirin (ECOTRIN LOW STRENGTH) 81 mg EC tablet, Take 81 mg by mouth daily, Disp: , Rfl:     benztropine (COGENTIN) 1 mg tablet, Take 1 tablet (1 mg total) by mouth daily at bedtime, Disp: 90 tablet, Rfl: 3    lithium carbonate (LITHOBID) 450 mg CR tablet, Take 2 tablets (900 mg total) by mouth daily at bedtime, Disp: 180 tablet, Rfl: 3    Multiple Vitamin (MULTI-VITAMIN) tablet, Take by mouth, Disp: , Rfl:     OLANZapine (ZyPREXA) 10 mg tablet, Take 1 tablet (10 mg total) by mouth daily at bedtime, Disp: 90 tablet, Rfl: 3       History Review: The following portions of the patient's history were reviewed and updated as appropriate: allergies, current medications, past family history, past medical history, past social history, past surgical history, and problem list.         OBJECTIVE:     Vital signs in last 24 hours: There were no vitals filed for this visit.     Mental Status Evaluation:    Appearance age appropriate, casually dressed   Behavior cooperative, calm   Speech normal rate, normal volume, normal pitch   Mood normal   Affect normal range and intensity, appropriate   Thought Processes organized, goal directed   Associations intact associations   Thought Content no overt delusions   Perceptual Disturbances: no auditory hallucinations, no visual hallucinations   Abnormal Thoughts  Risk Potential Suicidal ideation - None  Homicidal ideation - None  Potential for aggression - No   Orientation oriented to person, place, time/date, and situation   Memory recent and remote memory grossly intact   Consciousness alert and awake   Attention Span Concentration Span attention span and concentration are age appropriate   Intellect appears to be of average intelligence   Insight intact   Judgement intact   Muscle Strength and  Gait normal gait and normal balance   Motor activity Check HPI   Language no difficulty naming common objects, no difficulty repeating a phrase   Fund of Knowledge adequate knowledge of current events  adequate fund of knowledge regarding past history  adequate fund of knowledge regarding vocabulary    Pain none   Pain Scale 0       Laboratory Results: Most Recent Labs:   Lab Results   Component Value Date     06/12/2017    SODIUM 142 01/27/2020 K 4.2 01/27/2020     01/27/2020    CO2 29 01/27/2020    BUN 11 01/27/2020    CREATININE 1.00 01/27/2020    CALCIUM 9.5 01/27/2020    AST 14 06/07/2016    ALT 14 06/07/2016    ALKPHOS 46 06/07/2016    PROT 6.8 06/07/2016    BILITOT 0.6 06/07/2016    CHOL 127 06/07/2016    CHOLESTEROL 141 09/18/2019    TRIG 128 09/18/2019    HDL 35 (L) 09/18/2019    LDLCALC 84 09/18/2019    NONHDLC 96 06/07/2016    LITHIUM 0.6 01/27/2020     I have personally reviewed all pertinent laboratory/tests results. Assessment/Plan: Patient overall doing well and denies any concern. Plan is to continue with the current medication with no changes. He will follow up with me in 3 months or sooner if needed. The patient wants his appointment to be every 3 months. Patient educated about his meds again in detail and advised to call me if there is any concern and to call [facility], 911 or visit nearby ER in case of any emergency. Patient verbalized understanding and agrees with the plan. Diagnoses and all orders for this visit:    Bipolar disorder, in full remission, most recent episode manic ([address] [facility])          Treatment Recommendations/Precautions:      Aware of 24 hour and weekend coverage for urgent situations accessed by calling [facility] main practice number    Risks/Benefits      Risks, Benefits And Possible Side Effects Of Medications:    Risks, benefits, and possible side effects of medications explained to Funmi Sims and he verbalizes understanding and agreement for treatment. Controlled Medication Discussion:     Not applicable    Psychotherapy Provided:     Individual psychotherapy provided: Medications, treatment progress and treatment plan reviewed with Funmi Sims. Medication education provided to Funmi Sims. Reassurance and supportive therapy provided. Crisis/safety plan discussed with Funmi Sims.      Treatment Plan;    Completed and signed during the session: Not applicable - Treatment Plan not due at this session    Enedelia Peabody, MD 12/12/23

## 2024-03-11 ENCOUNTER — OFFICE VISIT (OUTPATIENT)
Dept: PSYCHIATRY | Facility: CLINIC | Age: 59
End: 2024-03-11
Payer: COMMERCIAL

## 2024-03-11 DIAGNOSIS — F31.74 BIPOLAR DISORDER, IN FULL REMISSION, MOST RECENT EPISODE MANIC (HCC): Primary | ICD-10-CM

## 2024-03-11 PROCEDURE — 99214 OFFICE O/P EST MOD 30 MIN: CPT | Performed by: PSYCHIATRY & NEUROLOGY

## 2024-03-11 RX ORDER — BENZTROPINE MESYLATE 1 MG/1
1 TABLET ORAL
Qty: 90 TABLET | Refills: 3 | Status: SHIPPED | OUTPATIENT
Start: 2024-03-11

## 2024-03-11 RX ORDER — LITHIUM CARBONATE 450 MG
900 TABLET, EXTENDED RELEASE ORAL
Qty: 180 TABLET | Refills: 3 | Status: SHIPPED | OUTPATIENT
Start: 2024-03-11 | End: 2025-03-11

## 2024-03-11 RX ORDER — OLANZAPINE 10 MG/1
10 TABLET ORAL
Qty: 90 TABLET | Refills: 3 | Status: SHIPPED | OUTPATIENT
Start: 2024-03-11

## 2024-03-11 NOTE — PSYCH
MEDICATION MANAGEMENT NOTE        [facility]      Name and Date of Birth:  Dutch Santamaria 58 y.o. 1965 MRN: [id number]    Date of Visit: March 11, 2024    Reason for Visit: Follow-up for medication management.    Subjective: The patient reports he has been doing well since he last saw me.  Reports mood has been stable and denies any manic, hypomanic or depressive episode.  Reports sleep, appetite, energy level has been fine.  Reports concentration has been good.  Patient is currently on disability.  He reports his wife does work as a  and he drives her to work every day.  He also reports he spends time reading or going out for the walk on daily basis.  Denies any hopelessness or suicidal thoughts.  Reports appetite has been fine.  Does not endorse any psychotic symptoms.  Denies any hallucinations or endorsing any paranoia or delusions.  Reports compliant with medication and denies any side effect.  On examination patient was noted to have mild to minimal tremor of his left hand only.  The patient though reports it has not been affecting him.  He also reports he rarely notices it.  Denies any other abnormal involuntary movement.  On examination also no other abnormal involuntary movement was noticed.  Denies any other concerns.  Reports he lives with his wife and 17-year-old daughter and things are going well.  The patient also denies any other medical issues lately.      Review Of Systems:      Constitutional negative   ENT negative   Cardiovascular negative   Respiratory negative   Gastrointestinal negative   Genitourinary negative   Musculoskeletal negative   Integumentary negative   Neurological negative   Endocrine negative   Other Symptoms none       Current Rating Scores:     Current PHQ-9   PHQ-2/9 Depression Screening             Suicide/Homicide Risk Assessment:    Risk of Harm to Self:  The following ratings are based on assessment at  the time of the interview and observation over the last 12 months  Demographic risk factors include:   Historical Risk Factors include: chronic mood disorder  Recent Specific Risk Factors include: diagnosis of mood disorder  Protective Factors: no current suicidal ideation, access to mental health treatment, being a parent, being , compliant with medications, compliant with mental health treatment, connection to own children, effective coping skills, strong relationships, supportive family, supportive friends  Weapons: none and no firearms. The following steps have been taken to ensure weapons are properly secured: not applicable  Based on today's assessment, Dutch presents the following risk of harm to self: minimal    Risk of Harm to Others:  The following ratings are based on assessment at the time of the interview  Based on today's assessment, Dutch presents the following risk of harm to others: none    The following interventions are recommended: contracts for safety at present - agrees to go to ED if feeling unsafe, contracts for safety at present - agrees to call Crisis Intervention Service if feeling unsafe    Alcohol/Substance Abuse: Denies        Past Medical History:    No past medical history on file.     No past surgical history on file.  No Known Allergies    Current Medications:       Current Outpatient Medications:     benztropine (COGENTIN) 1 mg tablet, Take 1 tablet (1 mg total) by mouth daily at bedtime, Disp: 90 tablet, Rfl: 3    lithium carbonate (LITHOBID) 450 mg CR tablet, Take 2 tablets (900 mg total) by mouth daily at bedtime, Disp: 180 tablet, Rfl: 3    OLANZapine (ZyPREXA) 10 mg tablet, Take 1 tablet (10 mg total) by mouth daily at bedtime, Disp: 90 tablet, Rfl: 3    Androderm 4 MG/24HR PT24, PLACE 1 PATCH DAILY (Patient not taking: Reported on 6/8/2023), Disp: , Rfl:     aspirin (ECOTRIN LOW STRENGTH) 81 mg EC tablet, Take 81 mg by mouth daily, Disp: , Rfl:      Multiple Vitamin (MULTI-VITAMIN) tablet, Take by mouth, Disp: , Rfl:        History Review: The following portions of the patient's history were reviewed and updated as appropriate: allergies, current medications, past family history, past medical history, past social history, past surgical history, and problem list.         OBJECTIVE:     Vital signs in last 24 hours:    There were no vitals filed for this visit.    Mental Status Evaluation:    Appearance age appropriate, casually dressed   Behavior cooperative, calm   Speech normal rate, normal volume, normal pitch   Mood normal   Affect normal range and intensity, appropriate   Thought Processes organized, goal directed   Associations intact associations   Thought Content no overt delusions   Perceptual Disturbances: no auditory hallucinations, no visual hallucinations   Abnormal Thoughts  Risk Potential Suicidal ideation - None  Homicidal ideation - None  Potential for aggression - No   Orientation oriented to person, place, time/date, and situation   Memory recent and remote memory grossly intact   Consciousness alert and awake   Attention Span Concentration Span attention span and concentration are age appropriate   Intellect appears to be of average intelligence   Insight intact   Judgement intact   Muscle Strength and  Gait normal gait and normal balance   Motor activity Minimal tremor of left hand   Language no difficulty naming common objects, no difficulty repeating a phrase   Fund of Knowledge adequate knowledge of current events  adequate fund of knowledge regarding past history  adequate fund of knowledge regarding vocabulary    Pain none   Pain Scale 0       Laboratory Results: I have personally reviewed all pertinent laboratory/tests results.  Last done in August last year and  liver function test again in November of last year    Assessment/Plan: The patient overall doing well and denies any concern.  Agreed to have the lab work done in 2 months  given the last one for routine blood work was in August last year.  Will include lithium level tested.  Plan is to continue with the current medication with no changes.  The patient educated about his meds in detail and advised to call me if there is any concern.  He was also advised to call me if he noticed worsening of tremors.  Was educated about risk of lithium with it and the need for decreasing the dose if his tremor worsens.  The patient agreed.  He will follow up with me in 3 months which he prefers or sooner if needed.  He was advised to call us if any concern and to call [facility], 911 or visit nearby ER in case of any emergency or having SI or HI.  The patient agrees.      Diagnoses and all orders for this visit:    Bipolar disorder, in full remission, most recent episode manic (HCC)  -     CBC and differential; Future  -     Comprehensive metabolic panel; Future  -     Lithium level; Future  -     TSH + Free T4; Future  -     OLANZapine (ZyPREXA) 10 mg tablet; Take 1 tablet (10 mg total) by mouth daily at bedtime  -     benztropine (COGENTIN) 1 mg tablet; Take 1 tablet (1 mg total) by mouth daily at bedtime  -     lithium carbonate (LITHOBID) 450 mg CR tablet; Take 2 tablets (900 mg total) by mouth daily at bedtime          Treatment Recommendations/Precautions:      Aware of 24 hour and weekend coverage for urgent situations accessed by calling [facility] main practice number    Risks/Benefits      Risks, Benefits And Possible Side Effects Of Medications:    Risks, benefits, and possible side effects of medications explained to Dutch and he verbalizes understanding and agreement for treatment.    Controlled Medication Discussion:     Not applicable    Psychotherapy Provided:     Individual psychotherapy provided: Medications, treatment progress and treatment plan reviewed with Dutch.  Medication education provided to Dutch.  Reassurance and supportive therapy provided.    Crisis/safety plan discussed with Dutch.     Treatment Plan;    Completed and signed during the session: Yes - with Dutch  Visit Time    Visit Start Time: 1:35 PM  Visit Stop Time: 1:55 PM  Total Visit Duration:  20 minutes      Alta Emanuel MD 03/11/24

## 2024-03-11 NOTE — BH TREATMENT PLAN
TREATMENT PLAN (Medication Management Only)        [facility]    Name and Date of Birth:  Dutch Santamaria 58 y.o. 1965  Date of Treatment Plan: March 11, 2024  Diagnosis/Diagnoses:    1. Bipolar disorder, in full remission, most recent episode manic (HCC)      Strengths/Personal Resources for Self-Care: supportive family, taking medications as prescribed, ability to adapt to life changes, ability to communicate needs, ability to communicate well, ability to listen, ability to reason, ability to understand psychiatric illness.  Area/Areas of need (in own words): mood instability  1. Long Term Goal: maintain improvement in mood stability.  Target Date:6 months - 9/11/2024  Person/Persons responsible for completion of goal: Dutch  2.  Short Term Objective (s) - How will we reach this goal?:   A. Provider new recommended medication/dosage changes and/or continue medication(s): continue current medications as prescribed.  B. N/A.  C. N/A.  Target Date:6 months - 9/11/2024  Person/Persons Responsible for Completion of Goal: Dutch  Progress Towards Goals: continuing treatment  Treatment Modality: medication management every 3 months  Review due 180 days from date of this plan: 6 months - 9/11/2024  Expected length of service: ongoing treatment  My Physician/PA/NP and I have developed this plan together and I agree to work on the goals and objectives. I understand the treatment goals that were developed for my treatment.

## 2024-06-11 ENCOUNTER — OFFICE VISIT (OUTPATIENT)
Dept: PSYCHIATRY | Facility: CLINIC | Age: 59
End: 2024-06-11
Payer: COMMERCIAL

## 2024-06-11 DIAGNOSIS — Z79.899 ASSESSMENT OF EFFECTS OF PSYCHOTROPIC DRUG: Primary | ICD-10-CM

## 2024-06-11 DIAGNOSIS — Z01.89 ASSESSMENT OF EFFECTS OF PSYCHOTROPIC DRUG: Primary | ICD-10-CM

## 2024-06-11 DIAGNOSIS — F31.74 BIPOLAR DISORDER, IN FULL REMISSION, MOST RECENT EPISODE MANIC (HCC): ICD-10-CM

## 2024-06-11 PROCEDURE — 99214 OFFICE O/P EST MOD 30 MIN: CPT | Performed by: PSYCHIATRY & NEUROLOGY

## 2024-06-11 NOTE — PSYCH
MEDICATION MANAGEMENT NOTE        [facility]      Name and Date of Birth:  Dutch Santamaria 58 y.o. 1965 MRN: [id number]    Date of Visit: June 11, 2024    Reason for Visit: Follow-up for medication management    Subjective: The patient reports he has been doing well since he last saw me 3 months back.  Reports mood has been stable and denies any manic or depressive episode.  Denies any hopelessness or having any suicidal thoughts.  Reports sleep, appetite, energy level has been fine.  Denies any weight loss or weight gain since he last saw me.  Reports going for a walk on a regular basis.  He is currently on disability but reports he drives his wife to work and picks daughter from school and keeps himself busy.  Denies any anxiety nowadays.  Does not endorse any psychotic symptoms.  On examination noted to have minimal tremor of both the hands.  The patient had undergone blood work and his CBC, CMP was within normal range.  His AST and ALT was very slightly elevated and at this time we will continue to have regular blood work every 6 months to 1 year.  The patient though was not able to do the lipid panel and was given a lab slip again to have it checked given his history of abnormal lipid panel last year but also currently being on Zyprexa.  The patient denies any abnormal involuntary movement other than minimal tremor of both hands.  His lithium level at the blood work was 0.9.  The patient denies any medical issues.  Denies any other concerns today.      Review Of Systems: Negative other than mentioned above      Constitutional negative   ENT negative   Cardiovascular negative   Respiratory negative   Gastrointestinal negative   Genitourinary negative   Musculoskeletal negative   Integumentary negative   Neurological negative   Endocrine negative   Other Symptoms none         Suicide/Homicide Risk Assessment:    Risk of Harm to Self:  The following ratings  are based on assessment at the time of the interview  Demographic risk factors include: , male  Historical Risk Factors include: history of mood disorder  Recent Specific Risk Factors include: diagnosis of mood disorder  Protective Factors: no current suicidal ideation, access to mental health treatment, being a parent, being , connection to own children, stable living environment, supportive family, supportive friends  Based on today's assessment, Dutch presents the following risk of harm to self: minimal    Risk of Harm to Others:  The following ratings are based on assessment at the time of the interview  Based on today's assessment, Dutch presents the following risk of harm to others: none    The following interventions are recommended: contracts for safety at present - agrees to go to ED if feeling unsafe, contracts for safety at present - agrees to call Crisis Intervention Service if feeling unsafe    Alcohol/Substance Abuse: Denies        Past Medical History:    History reviewed. No pertinent past medical history.     History reviewed. No pertinent surgical history.  No Known Allergies    Current Medications:       Current Outpatient Medications:     Androderm 4 MG/24HR PT24, PLACE 1 PATCH DAILY (Patient not taking: Reported on 6/8/2023), Disp: , Rfl:     aspirin (ECOTRIN LOW STRENGTH) 81 mg EC tablet, Take 81 mg by mouth daily, Disp: , Rfl:     benztropine (COGENTIN) 1 mg tablet, Take 1 tablet (1 mg total) by mouth daily at bedtime, Disp: 90 tablet, Rfl: 3    lithium carbonate (LITHOBID) 450 mg CR tablet, Take 2 tablets (900 mg total) by mouth daily at bedtime, Disp: 180 tablet, Rfl: 3    Multiple Vitamin (MULTI-VITAMIN) tablet, Take by mouth, Disp: , Rfl:     OLANZapine (ZyPREXA) 10 mg tablet, Take 1 tablet (10 mg total) by mouth daily at bedtime, Disp: 90 tablet, Rfl: 3       History Review: The following portions of the patient's history were reviewed and updated as appropriate:  allergies, current medications, past family history, past medical history, past social history, past surgical history, and problem list.         OBJECTIVE:     Vital signs in last 24 hours:    There were no vitals filed for this visit.    Mental Status Evaluation:    Appearance age appropriate, casually dressed   Behavior cooperative, calm   Speech normal rate, normal volume, normal pitch   Mood normal   Affect normal range and intensity, appropriate   Thought Processes organized, goal directed   Associations intact associations   Thought Content no overt delusions   Perceptual Disturbances: no auditory hallucinations, no visual hallucinations   Abnormal Thoughts  Risk Potential Suicidal ideation - None  Homicidal ideation - None  Potential for aggression - No   Orientation oriented to person, place, time/date, and situation   Memory recent and remote memory grossly intact   Consciousness alert and awake   Attention Span Concentration Span attention span and concentration are age appropriate   Intellect appears to be of average intelligence   Insight intact   Judgement intact   Muscle Strength and  Gait normal gait and normal balance   Motor activity no abnormal movements   Language no difficulty naming common objects, no difficulty repeating a phrase   Fund of Knowledge adequate knowledge of current events  adequate fund of knowledge regarding past history  adequate fund of knowledge regarding vocabulary    Pain none   Pain Scale 0       Laboratory Results: Most Recent Labs:   Lab Results   Component Value Date     06/12/2017    SODIUM 142 01/27/2020    K 4.2 01/27/2020     01/27/2020    CO2 29 01/27/2020    BUN 11 01/27/2020    CREATININE 1.00 01/27/2020    CALCIUM 9.5 01/27/2020    AST 14 06/07/2016    ALT 14 06/07/2016    ALKPHOS 46 06/07/2016    PROT 6.8 06/07/2016    BILITOT 0.6 06/07/2016    CHOL 127 06/07/2016    CHOLESTEROL 141 09/18/2019    TRIG 128 09/18/2019    HDL 35 (L) 09/18/2019     LDLCALC 84 09/18/2019    NONHDLC 96 06/07/2016    LITHIUM 0.6 01/27/2020     I have personally reviewed all pertinent laboratory/tests results.    Assessment/Plan: The patient overall doing well and denies any concern.  The plan is to continue with the current medication with no changes.  His lab work was mostly within normal range except for slightly elevated AST and ALT.  The patient given a lab slip for lipid panel and was advised to do it fasting.  Patient again educated about his medication in detail and advised to call us if any concern and to call crisis, 911 or visit nearby ER in case of any emergency having SI or HI.  Patient verbalized understanding agrees with the plan.  He will follow up with me in 3 months or sooner if needed.      Diagnoses and all orders for this visit:    Assessment of effects of psychotropic drug  -     Lipid panel; Future    Bipolar disorder, in full remission, most recent episode manic (HCC)          Treatment Recommendations/Precautions:      Aware of 24 hour and weekend coverage for urgent situations accessed by calling [facility] main practice number    Risks/Benefits      Risks, Benefits And Possible Side Effects Of Medications:    Risks, benefits, and possible side effects of medications explained to Dutch and he verbalizes understanding and agreement for treatment.    Controlled Medication Discussion:     Not applicable    Psychotherapy Provided:     Individual psychotherapy provided: Medications, treatment progress and treatment plan reviewed with Dutch.  Medication education provided to Dutch.  Reassurance and supportive therapy provided.   Crisis/safety plan discussed with Dutch.     Treatment Plan;    Completed and signed during the session: Not applicable - Treatment Plan not due at this session  Visit Time    Visit Start Time: 12:58 PM  Visit Stop Time: 1:11 PM  Total Visit Duration:  13 minutes      Alta Emanuel MD 06/11/24

## 2024-09-10 ENCOUNTER — OFFICE VISIT (OUTPATIENT)
Dept: PSYCHIATRY | Facility: CLINIC | Age: 59
End: 2024-09-10
Payer: COMMERCIAL

## 2024-09-10 DIAGNOSIS — F31.74 BIPOLAR DISORDER, IN FULL REMISSION, MOST RECENT EPISODE MANIC (HCC): ICD-10-CM

## 2024-09-10 PROCEDURE — 99213 OFFICE O/P EST LOW 20 MIN: CPT | Performed by: PSYCHIATRY & NEUROLOGY

## 2024-09-10 NOTE — BH TREATMENT PLAN
TREATMENT PLAN (Medication Management Only)        [facility] - [facility]    Name and Date of Birth:  Dutch Santamaria 58 y.o. 1965  Date of Treatment Plan: September 10, 2024  Diagnosis/Diagnoses:    1. Bipolar disorder, in full remission, most recent episode manic (HCC)      Strengths/Personal Resources for Self-Care: supportive family, supportive friends, taking medications as prescribed, ability to adapt to life changes, ability to communicate needs, ability to communicate well, ability to listen, ability to reason, ability to understand psychiatric illness.  Area/Areas of need (in own words): mood instability  1. Long Term Goal: maintain control of mood.  Target Date:6 months - 3/10/2025  Person/Persons responsible for completion of goal: Dutch  2.  Short Term Objective (s) - How will we reach this goal?:   A. Provider new recommended medication/dosage changes and/or continue medication(s): continue current medications as prescribed.  B. N/A.  C. N/A.  Target Date:6 months - 3/10/2025  Person/Persons Responsible for Completion of Goal: Dutch  Progress Towards Goals: continuing treatment  Treatment Modality: medication management every 6 months  Review due 180 days from date of this plan: 6 months - 3/10/2025  Expected length of service: ongoing treatment  My Physician/PA/NP and I have developed this plan together and I agree to work on the goals and objectives. I understand the treatment goals that were developed for my treatment.

## 2024-09-10 NOTE — PSYCH
MEDICATION MANAGEMENT NOTE        [facility] - [facility]      Name and Date of Birth:  Dutch Santamaria 58 y.o. 1965 MRN: [id number]    Date of Visit: September 10, 2024    Reason for Visit: Follow-up for medication management    Subjective: The patient reports he overall has been doing well.  Denies any changes or concerns since last visit.  Mood has been stable and denies any manic or depressive episode.  Reports sleep, appetite, energy level has been fine.  Denies any SI or HI.  Concentration has been good.  Reports compliant with his medications and denies any side effect.  The patient continues to have mild tremors of both the hands with his left hand tremors more prominent.  He though denies any abnormal involuntary movement.  Denies any other medical issues since he last saw me.  The patient did lipid panel blood work recently and his triglycerides are LDLs were slightly elevated.  I reviewed the results with the patient and encouraged to eat healthy diet along with exercising as tolerated.  He also was advised to follow with his primary care physician for review of his lipid panel.  The patient denies any other concerns today.      Review Of Systems: Negative other than mentioned above      Constitutional negative   ENT negative   Cardiovascular negative   Respiratory negative   Gastrointestinal negative   Genitourinary negative   Musculoskeletal negative   Integumentary negative   Neurological negative   Endocrine negative   Other Symptoms none       Suicide/Homicide Risk Assessment:     Risk of Harm to Self:  The following ratings are based on assessment at the time of the interview  Demographic risk factors include: , male  Historical Risk Factors include: history of mood disorder  Recent Specific Risk Factors include: diagnosis of mood disorder  Protective Factors: no current suicidal ideation, access to mental health treatment, being a parent, being  , connection to own children, stable living environment, supportive family, supportive friends  Based on today's assessment, Dutch presents the following risk of harm to self: minimal     Risk of Harm to Others:  The following ratings are based on assessment at the time of the interview  Based on today's assessment, Dutch presents the following risk of harm to others: none     The following interventions are recommended: contracts for safety at present - agrees to go to ED if feeling unsafe, contracts for safety at present - agrees to call Crisis Intervention Service if feeling unsafe       Alcohol/Substance Abuse: Denies        Past Medical History:    No past medical history on file.     No past surgical history on file.  No Known Allergies    Current Medications:       Current Outpatient Medications:     aspirin (ECOTRIN LOW STRENGTH) 81 mg EC tablet, Take 81 mg by mouth daily, Disp: , Rfl:     benztropine (COGENTIN) 1 mg tablet, Take 1 tablet (1 mg total) by mouth daily at bedtime, Disp: 90 tablet, Rfl: 3    lithium carbonate (LITHOBID) 450 mg CR tablet, Take 2 tablets (900 mg total) by mouth daily at bedtime, Disp: 180 tablet, Rfl: 3    Multiple Vitamin (MULTI-VITAMIN) tablet, Take by mouth, Disp: , Rfl:     OLANZapine (ZyPREXA) 10 mg tablet, Take 1 tablet (10 mg total) by mouth daily at bedtime, Disp: 90 tablet, Rfl: 3    Androderm 4 MG/24HR PT24, PLACE 1 PATCH DAILY (Patient not taking: Reported on 6/8/2023), Disp: , Rfl:        History Review: The following portions of the patient's history were reviewed and updated as appropriate: allergies, current medications, past family history, past medical history, past social history, past surgical history, and problem list.         OBJECTIVE:     Vital signs in last 24 hours:    There were no vitals filed for this visit.    Mental Status Evaluation:    Appearance age appropriate, casually dressed   Behavior cooperative, calm   Speech normal rate, normal  volume, normal pitch   Mood normal   Affect normal range and intensity, appropriate   Thought Processes organized, goal directed   Associations intact associations   Thought Content no overt delusions   Perceptual Disturbances: no auditory hallucinations, no visual hallucinations   Abnormal Thoughts  Risk Potential Suicidal ideation - None  Homicidal ideation - None  Potential for aggression - No   Orientation oriented to person, place, time/date, and situation   Memory recent and remote memory grossly intact   Consciousness alert and awake   Attention Span Concentration Span attention span and concentration are age appropriate   Intellect appears to be of average intelligence   Insight intact   Judgement intact   Muscle Strength and  Gait normal gait and normal balance   Motor activity no abnormal movements   Language no difficulty naming common objects, no difficulty repeating a phrase   Fund of Knowledge adequate knowledge of current events  adequate fund of knowledge regarding past history  adequate fund of knowledge regarding vocabulary    Pain none   Pain Scale 0       Laboratory Results: Most Recent Labs:   Lab Results   Component Value Date     06/12/2017    SODIUM 142 01/27/2020    K 4.2 01/27/2020     01/27/2020    CO2 29 01/27/2020    BUN 11 01/27/2020    CREATININE 1.00 01/27/2020    CALCIUM 9.5 01/27/2020    AST 14 06/07/2016    ALT 14 06/07/2016    ALKPHOS 46 06/07/2016    PROT 6.8 06/07/2016    BILITOT 0.6 06/07/2016    CHOL 127 06/07/2016    CHOLESTEROL 141 09/18/2019    TRIG 128 09/18/2019    HDL 35 (L) 09/18/2019    LDLCALC 84 09/18/2019    NONHDLC 96 06/07/2016    LITHIUM 0.6 01/27/2020     I have personally reviewed all pertinent laboratory/tests results.    Assessment/Plan: Patient overall doing well and denies any concerns.  The plan is to continue the current psychiatric medications with no changes.  The patient will follow up with me in 6 months or sooner if needed.  He was again  educated about his meds in detail and advised to call us if any concern and to call crisis, 911 or visit nearby ER in case of any emergency or having any SI or HI.  The patient verbalized understanding and agrees with the current plan.      Diagnoses and all orders for this visit:    Bipolar disorder, in full remission, most recent episode manic (HCC)          Treatment Recommendations/Precautions:      Aware of 24 hour and weekend coverage for urgent situations accessed by calling [facility] main practice number    Risks/Benefits      Risks, Benefits And Possible Side Effects Of Medications:    Risks, benefits, and possible side effects of medications explained to Dutch and he verbalizes understanding and agreement for treatment.    Controlled Medication Discussion:     Not applicable    Psychotherapy Provided:     Individual psychotherapy provided: Medications, treatment progress and treatment plan reviewed with Dutch.  Medication education provided to Dutch.  Supportive therapy provided.   Crisis/safety plan discussed with Dutch.     Treatment Plan;    Completed and signed during the session: Yes - with Dutch  Visit Time    Visit Start Time: 12:49 PM  Visit Stop Time: 1:02 PM  Total Visit Duration:  13 minutes      Alta Emanuel MD 09/10/24

## 2024-10-25 ENCOUNTER — OFFICE VISIT (OUTPATIENT)
Dept: PSYCHIATRY | Facility: CLINIC | Age: 59
End: 2024-10-25
Payer: COMMERCIAL

## 2024-10-25 ENCOUNTER — TELEPHONE (OUTPATIENT)
Age: 59
End: 2024-10-25

## 2024-10-25 DIAGNOSIS — F31.74 BIPOLAR DISORDER, IN FULL REMISSION, MOST RECENT EPISODE MANIC (HCC): Primary | ICD-10-CM

## 2024-10-25 PROCEDURE — 99214 OFFICE O/P EST MOD 30 MIN: CPT | Performed by: PSYCHIATRY & NEUROLOGY

## 2024-10-25 NOTE — TELEPHONE ENCOUNTER
Patient called to add new updated version of his insurance on file. Writer was unable to add a duplicate insurance. Writer provided the billing departments number to the patient for assistance.

## 2024-10-25 NOTE — PSYCH
MEDICATION MANAGEMENT NOTE        [facility] - [facility]      Name and Date of Birth:  Dutch Santamaria 59 y.o. 1965 MRN: [id number]    Date of Visit: October 25, 2024      Assessment & Plan  Bipolar disorder, in full remission, most recent episode manic (HCC)        Reason for Visit: Follow-up appointment for medication management    Subjective: The patient rescheduled his appointment for today.  The patient reports that over the last couple of weeks he has noticed seeing shadows on the wall and occasionally hearing his name.  Though denies it is present every day.  Denies it has been bothersome or affecting his mood or anxiety.  The patient though stated that he just thought to schedule appointment to tell me about it.  He also wanted to remind me that he is on Social Security disability and had tried to go back to work in the past but due to disability from his mental health issues he had to quit the job.  He was concerned that I might not be aware of it and wanted to remind me.  He does not seem to endorse any other paranoia or delusional ideation.  He did reports his mood otherwise has been good.  Denies any depression or manic episode lately.  Denies any SI or HI.  Reports sleep has been good.  Reports appetite has been fine.  Reports compliant with his psychiatric medication and denies any side effect.  He does still on examination is noted to have mild tremor of his both the hands more on the left than right.  He though denies any other abnormal involuntary movement.  Denies any weight gain.  He had the blood work done within last 3 to 4 months including lithium level which was within normal range.  His lipid panel was slightly elevated especially triglycerides and LDL.  He was again encouraged to take care of his diet and exercise as tolerated.  The patient agreed.  Denies any other concerns today.    Review Of Systems: Negative other than mentioned above       Constitutional negative   ENT negative   Cardiovascular negative   Respiratory negative   Gastrointestinal negative   Genitourinary negative   Musculoskeletal negative   Integumentary negative   Neurological negative   Endocrine negative   Other Symptoms none     Suicide/Homicide Risk Assessment:     Risk of Harm to Self:  The following ratings are based on assessment at the time of the interview  Demographic risk factors include: , male  Historical Risk Factors include: history of mood disorder  Recent Specific Risk Factors include: diagnosis of mood disorder  Protective Factors: no current suicidal ideation, access to mental health treatment, being a parent, being , connection to own children, stable living environment, supportive family, supportive friends  Based on today's assessment, Dutch presents the following risk of harm to self: minimal     Risk of Harm to Others:  The following ratings are based on assessment at the time of the interview  Based on today's assessment, Dutch presents the following risk of harm to others: none     The following interventions are recommended: contracts for safety at present - agrees to go to ED if feeling unsafe, contracts for safety at present - agrees to call Crisis Intervention Service if feeling unsafe       Alcohol/Substance Abuse:  denies        Past Medical History:    No past medical history on file.     No past surgical history on file.  No Known Allergies    Current Medications:       Current Outpatient Medications:     Androderm 4 MG/24HR PT24, PLACE 1 PATCH DAILY (Patient not taking: Reported on 6/8/2023), Disp: , Rfl:     aspirin (ECOTRIN LOW STRENGTH) 81 mg EC tablet, Take 81 mg by mouth daily, Disp: , Rfl:     benztropine (COGENTIN) 1 mg tablet, Take 1 tablet (1 mg total) by mouth daily at bedtime, Disp: 90 tablet, Rfl: 3    lithium carbonate (LITHOBID) 450 mg CR tablet, Take 2 tablets (900 mg total) by mouth daily at bedtime, Disp: 180  "tablet, Rfl: 3    Multiple Vitamin (MULTI-VITAMIN) tablet, Take by mouth, Disp: , Rfl:     OLANZapine (ZyPREXA) 10 mg tablet, Take 1 tablet (10 mg total) by mouth daily at bedtime, Disp: 90 tablet, Rfl: 3       History Review: The following portions of the patient's history were reviewed and updated as appropriate: allergies, current medications, past family history, past medical history, past social history, past surgical history, and problem list.         OBJECTIVE:     Vital signs in last 24 hours:    There were no vitals filed for this visit.    Mental Status Evaluation:    Appearance age appropriate, casually dressed   Behavior cooperative, calm   Speech normal rate, normal volume, normal pitch   Mood normal   Affect Seem slightly anxious   Thought Processes organized, goal directed   Associations intact associations   Thought Content no overt delusions   Perceptual Disturbances: no auditory hallucinations, no visual hallucinations, auditory hallucinations of \"name being called\", visual hallucinations of \"shadows\" denies any commanding voices.  Denies it affecting him   Abnormal Thoughts  Risk Potential Suicidal ideation - None  Homicidal ideation - None  Potential for aggression - No   Orientation oriented to person, place, time/date, and situation   Memory recent and remote memory grossly intact   Consciousness alert and awake   Attention Span Concentration Span attention span and concentration are age appropriate   Intellect appears to be of average intelligence   Insight intact   Judgement intact   Muscle Strength and  Gait normal gait and normal balance   Motor activity no abnormal movements   Language no difficulty naming common objects, no difficulty repeating a phrase   Fund of Knowledge adequate knowledge of current events  adequate fund of knowledge regarding past history  adequate fund of knowledge regarding vocabulary    Pain none   Pain Scale 0       Laboratory Results: Most Recent Labs:   Lab " Results   Component Value Date     06/12/2017    SODIUM 142 01/27/2020    K 4.2 01/27/2020     01/27/2020    CO2 29 01/27/2020    BUN 11 01/27/2020    CREATININE 1.00 01/27/2020    CALCIUM 9.5 01/27/2020    AST 14 06/07/2016    ALT 14 06/07/2016    ALKPHOS 46 06/07/2016    PROT 6.8 06/07/2016    BILITOT 0.6 06/07/2016    CHOL 127 06/07/2016    CHOLESTEROL 141 09/18/2019    TRIG 128 09/18/2019    HDL 35 (L) 09/18/2019    LDLCALC 84 09/18/2019    NONHDLC 96 06/07/2016    LITHIUM 0.6 01/27/2020     I have personally reviewed all pertinent laboratory/tests results.    Assessment/Plan: Patient expressing occasional auditory and visual hallucination lately though denies it has been bothering him.  Reports only for a couple of weeks now and also not on daily basis.  Seems mildly paranoid as he was concerned that I might not be aware of his Social Security disability and in case he loses that benefit.  Though does not endorse any other paranoia.  Denies any mood changes or any manic episode.  Sleep is good.  I recommended the patient to consider increasing the dose of olanzapine given his hallucinations and mild paranoia though patient declined at this time.  He feels he overall he is doing well despite of the symptoms and it has not been affecting him or his mood.  At this time the plan is to continue with his current psychiatric medication with no changes but the patient was encouraged if hallucinations worsen to call me immediately and I can increase the dose of olanzapine to 12.5 mg at bedtime.  The patient agreed.  Patient educated about his medication again in detail and advised to call us if any concern and to call crisis, 911 or return to the ER in case of any emergency having SI or HI.  The patient agrees.  We will follow with me in March next year or sooner if needed.      Diagnoses and all orders for this visit:    Bipolar disorder, in full remission, most recent episode manic (HCC)          Treatment  Recommendations/Precautions:      Aware of 24 hour and weekend coverage for urgent situations accessed by calling [facility] main practice number    Risks/Benefits      Risks, Benefits And Possible Side Effects Of Medications:    Risks, benefits, and possible side effects of medications explained to Dutch and he verbalizes understanding and agreement for treatment.    Controlled Medication Discussion:     Not applicable    Psychotherapy Provided:     Individual psychotherapy provided: Medications, treatment progress and treatment plan reviewed with Dutch.  Medication education provided to Dutch.  Reassurance and supportive therapy provided.   Crisis/safety plan discussed with Dutch.     Treatment Plan;    Completed and signed during the session: Not applicable - Treatment Plan not due at this session  Visit Time    Visit Start Time: 1:31 PM  Visit Stop Time: 1:50 PM  Total Visit Duration:  19 minutes        Alta Emanuel MD 10/25/24

## 2024-12-18 DIAGNOSIS — F31.74 BIPOLAR DISORDER, IN FULL REMISSION, MOST RECENT EPISODE MANIC (HCC): ICD-10-CM

## 2024-12-18 RX ORDER — BENZTROPINE MESYLATE 1 MG/1
1 TABLET ORAL
Qty: 90 TABLET | Refills: 3 | Status: SHIPPED | OUTPATIENT
Start: 2024-12-18

## 2024-12-18 RX ORDER — OLANZAPINE 10 MG/1
10 TABLET ORAL
Qty: 90 TABLET | Refills: 3 | Status: SHIPPED | OUTPATIENT
Start: 2024-12-18

## 2024-12-18 RX ORDER — LITHIUM CARBONATE 450 MG
900 TABLET, EXTENDED RELEASE ORAL
Qty: 180 TABLET | Refills: 3 | Status: SHIPPED | OUTPATIENT
Start: 2024-12-18 | End: 2025-12-18

## 2024-12-18 NOTE — TELEPHONE ENCOUNTER
Medication Refill Request     Name of Medication Zyprexa  Dose/Frequency 10mg, Take 1 tablet (10 mg total) by mouth daily at bedtim   Quantity 90 tablet  Verified pharmacy   [x]  Verified ordering Provider   [x]  Does patient have enough for the next 3 days? Yes [] No [x]  Does patient have a follow-up appointment scheduled? Yes [x] No []   If so when is appointment: 3/10 @1    Medication Refill Request     Name of Medication Lithium Carbonate  Dose/Frequency 450mg, Take 2 tablets (900 mg total) by mouth daily at bedtime   Quantity 180 tablet  Verified pharmacy   [x]  Verified ordering Provider   [x]  Does patient have enough for the next 3 days? Yes [] No [x]  Does patient have a follow-up appointment scheduled? Yes [x] No []   If so when is appointment: 3/10 @1    Medication Refill Request     Name of Medication Benzotropine  Dose/Frequency 1mg, Take 1 tablet (1 mg total) by mouth daily at bedtime   Quantity 90 tablet  Verified pharmacy   [x]  Verified ordering Provider   [x]  Does patient have enough for the next 3 days? Yes [] No [x]  Does patient have a follow-up appointment scheduled? Yes [x] No []   If so when is appointment: 3/10 @1

## 2025-03-10 ENCOUNTER — OFFICE VISIT (OUTPATIENT)
Dept: PSYCHIATRY | Facility: CLINIC | Age: 60
End: 2025-03-10
Payer: COMMERCIAL

## 2025-03-10 DIAGNOSIS — F31.74 BIPOLAR DISORDER, IN FULL REMISSION, MOST RECENT EPISODE MANIC (HCC): Primary | ICD-10-CM

## 2025-03-10 DIAGNOSIS — Z79.899 ASSESSMENT OF EFFECTS OF PSYCHOTROPIC DRUG: ICD-10-CM

## 2025-03-10 DIAGNOSIS — Z01.89 ASSESSMENT OF EFFECTS OF PSYCHOTROPIC DRUG: ICD-10-CM

## 2025-03-10 PROCEDURE — 99214 OFFICE O/P EST MOD 30 MIN: CPT | Performed by: PSYCHIATRY & NEUROLOGY

## 2025-03-10 NOTE — BH TREATMENT PLAN
TREATMENT PLAN (Medication Management Only)        [facility] - [facility]    Name and Date of Birth:  Dutch Santamaria 59 y.o. 1965  MRN: [id number]  Date of Treatment Plan: March 10, 2025  Diagnosis/Diagnoses:    1. Bipolar disorder, in full remission, most recent episode manic (HCC)    2. Assessment of effects of psychotropic drug      Strengths/Personal Resources for Self-Care: supportive family, supportive friends, taking medications as prescribed, ability to adapt to life changes, ability to communicate needs, ability to communicate well, ability to listen, ability to reason, ability to understand psychiatric illness, average or above intelligence.  Area/Areas of need (in own words): mood instability  1. Long Term Goal:   continue improvement in mood.  Target Date:6 months - 9/10/2025  Person/Persons responsible for completion of goal: Dutch  2.  Short Term Objective (s) - How will we reach this goal?:   A.  Provider new recommended medication/dosage changes and/or continue medication(s): continue current medications as prescribed.  B.  N/A.  C.  N/A.  Target Date:6 months - 9/10/2025  Person/Persons Responsible for Completion of Goal: Dutch  Progress Towards Goals: continuing treatment  Treatment Modality: medication management every 4 months  Review due 180 days from date of this plan: 6 months - 9/10/2025  Expected length of service: ongoing treatment unless revised  My Physician/PA/NP and I have developed this plan together and I agree to work on the goals and objectives. I understand the treatment goals that were developed for my treatment.   Electronic Signatures: on file (unless signed below)    Alta Emanuel MD 03/10/25

## 2025-03-10 NOTE — PSYCH
MEDICATION MANAGEMENT NOTE        [facility]      Name and Date of Birth:  Dutch Santamaria 59 y.o. 1965 MRN: [id number]    Date of Visit: March 10, 2025      Assessment & Plan  Bipolar disorder, in full remission, most recent episode manic (HCC)    Assessment of effects of psychotropic drug        Reason for Visit: Follow-up for medication management    Subjective: The patient reports he overall has been doing well since he last saw me.  Reports mood has been stable and denies any depression or manic episodes since he last saw me.  Reports sleep, appetite, energy level has been fine.  Concentration has been good.  Denies any auditory or visual hallucinations since he last saw me.  Patient at last visit complained of hallucination but reports it resolved after his last visit.  Denies any concerns about it nowadays.  Does not endorse any paranoia or delusional ideation nowadays.  Reports compliant with his psychiatric medication and denies any side effect.  Denies any other concerns today.  Patient was  advised to have the routine blood work done including lithium level in next few weeks.  The patient agreed and was given the lab slip.  Denies any other medical issues lately.  On examination was noted to have very minimal tremors of both hands.  Patient denies any other abnormal involuntary movement.      Review Of Systems: Negative other than mentioned above      Constitutional negative   ENT negative   Cardiovascular negative   Respiratory negative   Gastrointestinal negative   Genitourinary negative   Musculoskeletal negative   Integumentary negative   Neurological negative   Endocrine negative   Other Symptoms none           Suicide/Homicide Risk Assessment:     Risk of Harm to Self:  The following ratings are based on assessment at the time of the interview  Demographic risk factors include: , male  Historical Risk Factors include: history of mood  disorder  Recent Specific Risk Factors include: diagnosis of mood disorder  Protective Factors: no current suicidal ideation, access to mental health treatment, being a parent, being , connection to own children, stable living environment, supportive family, supportive friends  Based on today's assessment, Dutch presents the following risk of harm to self: minimal     Risk of Harm to Others:  The following ratings are based on assessment at the time of the interview  Based on today's assessment, Dutch presents the following risk of harm to others: none     The following interventions are recommended: contracts for safety at present - agrees to go to ED if feeling unsafe, contracts for safety at present - agrees to call Crisis Intervention Service if feeling unsafe    Alcohol/Substance Abuse: Denies        Past Medical History:    History reviewed. No pertinent past medical history.     History reviewed. No pertinent surgical history.  No Known Allergies    Current Medications:       Current Outpatient Medications:     Androderm 4 MG/24HR PT24, PLACE 1 PATCH DAILY (Patient not taking: Reported on 6/8/2023), Disp: , Rfl:     aspirin (ECOTRIN LOW STRENGTH) 81 mg EC tablet, Take 81 mg by mouth daily, Disp: , Rfl:     benztropine (COGENTIN) 1 mg tablet, Take 1 tablet (1 mg total) by mouth daily at bedtime, Disp: 90 tablet, Rfl: 3    lithium carbonate (LITHOBID) 450 mg CR tablet, Take 2 tablets (900 mg total) by mouth daily at bedtime, Disp: 180 tablet, Rfl: 3    Multiple Vitamin (MULTI-VITAMIN) tablet, Take by mouth, Disp: , Rfl:     OLANZapine (ZyPREXA) 10 mg tablet, Take 1 tablet (10 mg total) by mouth daily at bedtime, Disp: 90 tablet, Rfl: 3       History Review: The following portions of the patient's history were reviewed and updated as appropriate: allergies, current medications, past family history, past medical history, past social history, past surgical history, and problem list.         OBJECTIVE:      Vital signs in last 24 hours:    There were no vitals filed for this visit.    Mental Status Evaluation:    Appearance age appropriate, casually dressed   Behavior cooperative, calm   Speech normal rate, normal volume, normal pitch   Mood normal   Affect normal range and intensity, appropriate   Thought Processes organized, goal directed   Associations intact associations   Thought Content no overt delusions   Perceptual Disturbances: no auditory hallucinations, no visual hallucinations   Abnormal Thoughts  Risk Potential Suicidal ideation - None  Homicidal ideation - None  Potential for aggression - No   Orientation oriented to person, place, time/date, and situation   Memory recent and remote memory grossly intact   Consciousness alert and awake   Attention Span Concentration Span attention span and concentration are age appropriate   Intellect appears to be of average intelligence   Insight intact   Judgement intact   Muscle Strength and  Gait normal gait and normal balance   Motor activity Minimal tremor of both hands   Language no difficulty naming common objects, no difficulty repeating a phrase   Fund of Knowledge adequate knowledge of current events  adequate fund of knowledge regarding past history  adequate fund of knowledge regarding vocabulary    Pain none   Pain Scale 0       Laboratory Results: Most Recent Labs:   Lab Results   Component Value Date     06/12/2017    SODIUM 142 01/27/2020    K 4.2 01/27/2020     01/27/2020    CO2 29 01/27/2020    BUN 11 01/27/2020    CREATININE 1.00 01/27/2020    CALCIUM 9.5 01/27/2020    AST 14 06/07/2016    ALT 14 06/07/2016    ALKPHOS 46 06/07/2016    PROT 6.8 06/07/2016    BILITOT 0.6 06/07/2016    CHOL 127 06/07/2016    CHOLESTEROL 141 09/18/2019    TRIG 128 09/18/2019    HDL 35 (L) 09/18/2019    LDLCALC 84 09/18/2019    NONHDLC 96 06/07/2016    LITHIUM 0.6 01/27/2020     I have personally reviewed all pertinent laboratory/tests  results.    Assessment/Plan: Patient overall has been doing well and denies any concerns.  Plan is to continue with the current psychiatric medications with no changes.  He was encouraged to have the blood work done as recommended below.  He was again educated about his meds in detail and advised to call me if there is any concern and to call [facility], 911 or visit nearby ER in case of any emergency or having SI or HI.  Patient verbalized understanding and agrees with the current plan.  He will follow-up with me in 4 months or sooner if needed.      Diagnoses and all orders for this visit:    Bipolar disorder, in full remission, most recent episode manic (HCC)    Assessment of effects of psychotropic drug  -     CBC and differential; Future  -     Comprehensive metabolic panel; Future  -     TSH + Free T4; Future  -     Lipid panel; Future  -     Lithium level; Future          Treatment Recommendations/Precautions:      Aware of 24 hour and weekend coverage for urgent situations accessed by calling [facility] main practice number    Risks/Benefits      Risks, Benefits And Possible Side Effects Of Medications:    Risks, benefits, and possible side effects of medications explained to Dutch and he verbalizes understanding and agreement for treatment.    Controlled Medication Discussion:     Not applicable    Psychotherapy Provided:     Individual psychotherapy provided: Medications, treatment progress and treatment plan reviewed with Dutch.  Medication education provided to Dutch.  Reassurance and supportive therapy provided.   Crisis/safety plan discussed with Dutch.     Treatment Plan;    Completed and signed during the session: Yes - with Dutch  Visit Time    Visit Start Time: 1 PM  Visit Stop Time: 1:17 PM  Total Visit Duration:  17 minutes        Alta Emanuel MD 03/10/25

## 2025-07-15 ENCOUNTER — OFFICE VISIT (OUTPATIENT)
Dept: PSYCHIATRY | Facility: CLINIC | Age: 60
End: 2025-07-15
Payer: COMMERCIAL

## 2025-07-15 DIAGNOSIS — F31.74 BIPOLAR DISORDER, IN FULL REMISSION, MOST RECENT EPISODE MANIC (HCC): ICD-10-CM

## 2025-07-15 PROCEDURE — 99213 OFFICE O/P EST LOW 20 MIN: CPT | Performed by: PSYCHIATRY & NEUROLOGY

## 2025-08-19 DIAGNOSIS — F31.74 BIPOLAR DISORDER, IN FULL REMISSION, MOST RECENT EPISODE MANIC (HCC): ICD-10-CM

## 2025-08-19 RX ORDER — BENZTROPINE MESYLATE 1 MG/1
1 TABLET ORAL
Qty: 90 TABLET | Refills: 3 | Status: SHIPPED | OUTPATIENT
Start: 2025-08-19